# Patient Record
Sex: FEMALE | Race: BLACK OR AFRICAN AMERICAN | NOT HISPANIC OR LATINO | Employment: FULL TIME | URBAN - METROPOLITAN AREA
[De-identification: names, ages, dates, MRNs, and addresses within clinical notes are randomized per-mention and may not be internally consistent; named-entity substitution may affect disease eponyms.]

---

## 2017-01-11 ENCOUNTER — HISTORICAL (OUTPATIENT)
Dept: LAB | Facility: HOSPITAL | Age: 22
End: 2017-01-11

## 2017-01-12 ENCOUNTER — HISTORICAL (OUTPATIENT)
Dept: LAB | Facility: HOSPITAL | Age: 22
End: 2017-01-12

## 2020-04-27 ENCOUNTER — HISTORICAL (OUTPATIENT)
Dept: RADIOLOGY | Facility: HOSPITAL | Age: 25
End: 2020-04-27

## 2022-03-31 ENCOUNTER — HISTORICAL (OUTPATIENT)
Dept: ADMINISTRATIVE | Facility: HOSPITAL | Age: 27
End: 2022-03-31

## 2022-04-07 ENCOUNTER — HISTORICAL (OUTPATIENT)
Dept: LAB | Facility: HOSPITAL | Age: 27
End: 2022-04-07

## 2022-04-07 LAB
B-HCG FREE SERPL-ACNC: NORMAL [IU]/L
CHLAMYDIA TRACHOMATIS (PRECISION): NEGATIVE
HPV16+18+H RISK 12 DNA CVX-IMP: NEGATIVE
NEISSERIA GONORRHOEAE (PRECISION): NEGATIVE
PAP RECOMMENDATION EXT: NORMAL
PAP SMEAR: NORMAL
TRICHOMONAS VAGINALIS (PRECISION): NEGATIVE

## 2022-04-09 ENCOUNTER — HISTORICAL (OUTPATIENT)
Dept: LAB | Facility: HOSPITAL | Age: 27
End: 2022-04-09

## 2022-04-09 LAB — B-HCG FREE SERPL-ACNC: NORMAL [IU]/L

## 2022-05-06 ENCOUNTER — LAB VISIT (OUTPATIENT)
Dept: LAB | Facility: HOSPITAL | Age: 27
End: 2022-05-06
Attending: OBSTETRICS & GYNECOLOGY
Payer: MEDICAID

## 2022-05-06 DIAGNOSIS — Z34.81 ENCOUNTER FOR SUPERVISION OF NORMAL PREGNANCY IN MULTIGRAVIDA IN FIRST TRIMESTER: Primary | ICD-10-CM

## 2022-05-06 LAB
AMPHET UR QL SCN: NEGATIVE
BARBITURATE SCN PRESENT UR: NEGATIVE
BASOPHILS # BLD AUTO: 0.03 X10(3)/MCL (ref 0–0.2)
BASOPHILS NFR BLD AUTO: 0.3 %
BENZODIAZ UR QL SCN: NEGATIVE
CANNABINOIDS UR QL SCN: NEGATIVE
COCAINE UR QL SCN: NEGATIVE
EOSINOPHIL # BLD AUTO: 0.05 X10(3)/MCL (ref 0–0.9)
EOSINOPHIL NFR BLD AUTO: 0.5 %
ERYTHROCYTE [DISTWIDTH] IN BLOOD BY AUTOMATED COUNT: 13.1 % (ref 11.5–17)
GROUP & RH: NORMAL
HBV SURFACE AG SERPL QL IA: NONREACTIVE
HCT VFR BLD AUTO: 35.9 % (ref 37–47)
HCV AB SERPL QL IA: NONREACTIVE
HGB BLD-MCNC: 11.7 GM/DL (ref 12–16)
HIV 1+2 AB+HIV1 P24 AG SERPL QL IA: NONREACTIVE
IMM GRANULOCYTES # BLD AUTO: 0.03 X10(3)/MCL (ref 0–0.02)
IMM GRANULOCYTES NFR BLD AUTO: 0.3 % (ref 0–0.43)
INDIRECT COOMBS GEL: NORMAL
LYMPHOCYTES # BLD AUTO: 2.02 X10(3)/MCL (ref 0.6–4.6)
LYMPHOCYTES NFR BLD AUTO: 21.7 %
MCH RBC QN AUTO: 28.1 PG (ref 27–31)
MCHC RBC AUTO-ENTMCNC: 32.6 MG/DL (ref 33–36)
MCV RBC AUTO: 86.3 FL (ref 80–94)
MONOCYTES # BLD AUTO: 0.43 X10(3)/MCL (ref 0.1–1.3)
MONOCYTES NFR BLD AUTO: 4.6 %
NEUTROPHILS # BLD AUTO: 6.8 X10(3)/MCL (ref 2.1–9.2)
NEUTROPHILS NFR BLD AUTO: 72.6 %
OPIATES UR QL SCN: NEGATIVE
PCP UR QL: NEGATIVE
PH UR: 6 [PH] (ref 3–11)
PLATELET # BLD AUTO: 250 X10(3)/MCL (ref 130–400)
PMV BLD AUTO: 11.1 FL (ref 9.4–12.4)
RBC # BLD AUTO: 4.16 X10(6)/MCL (ref 4.2–5.4)
SPECIFIC GRAVITY, URINE AUTO (.000) (OHS): 1.02 (ref 1–1.03)
T PALLIDUM AB SER QL: NONREACTIVE
T PALLIDUM AB SER QL: NORMAL
T4 FREE SERPL-MCNC: 0.83 NG/DL (ref 0.7–1.48)
TSH SERPL-ACNC: 0.4 UIU/ML (ref 0.35–4.94)
WBC # SPEC AUTO: 9.3 X10(3)/MCL (ref 4.5–11.5)

## 2022-05-06 PROCEDURE — 86762 RUBELLA ANTIBODY: CPT

## 2022-05-06 PROCEDURE — 86850 RBC ANTIBODY SCREEN: CPT | Performed by: OBSTETRICS & GYNECOLOGY

## 2022-05-06 PROCEDURE — 86780 TREPONEMA PALLIDUM: CPT

## 2022-05-06 PROCEDURE — 84443 ASSAY THYROID STIM HORMONE: CPT

## 2022-05-06 PROCEDURE — 87389 HIV-1 AG W/HIV-1&-2 AB AG IA: CPT

## 2022-05-06 PROCEDURE — 86787 VARICELLA-ZOSTER ANTIBODY: CPT

## 2022-05-06 PROCEDURE — 85660 RBC SICKLE CELL TEST: CPT

## 2022-05-06 PROCEDURE — 80307 DRUG TEST PRSMV CHEM ANLYZR: CPT

## 2022-05-06 PROCEDURE — 86376 MICROSOMAL ANTIBODY EACH: CPT

## 2022-05-06 PROCEDURE — 36415 COLL VENOUS BLD VENIPUNCTURE: CPT

## 2022-05-06 PROCEDURE — 86803 HEPATITIS C AB TEST: CPT

## 2022-05-06 PROCEDURE — 87340 HEPATITIS B SURFACE AG IA: CPT

## 2022-05-06 PROCEDURE — 85025 COMPLETE CBC W/AUTO DIFF WBC: CPT

## 2022-05-06 PROCEDURE — 84439 ASSAY OF FREE THYROXINE: CPT

## 2022-05-09 LAB — HGB S BLD QL SOLY: NEGATIVE

## 2022-05-10 LAB
RUBV IGG SERPL IA-ACNC: 0.5
RUBV IGG SERPL QL IA: NEGATIVE
VZV IGG SER IA-ACNC: <0.2
VZV IGG SER QL IA: NEGATIVE

## 2022-06-29 ENCOUNTER — LAB VISIT (OUTPATIENT)
Dept: LAB | Facility: HOSPITAL | Age: 27
End: 2022-06-29
Attending: OBSTETRICS & GYNECOLOGY
Payer: MEDICAID

## 2022-06-29 DIAGNOSIS — Z34.82 SUPERVISION OF NORMAL INTRAUTERINE PREGNANCY IN MULTIGRAVIDA, SECOND TRIMESTER: ICD-10-CM

## 2022-06-29 PROCEDURE — 36415 COLL VENOUS BLD VENIPUNCTURE: CPT

## 2022-07-01 LAB
# FETUSES: NORMAL
2ND TRIMESTER 4 SCREEN SERPL-IMP: NORMAL
AFP ADJ MOM SERPL: 0.98 MOM
AFP SERPL IA-MCNC: 36.8 NG/ML
AGE AT DELIVERY: NORMAL
B-HCG ADJ MOM SERPL: 0.86 MOM
CHORION TYPE: NORMAL
COLLECT DATE: NORMAL
CURRENT SMOKER: NORMAL
FET TS 21 RISK FROM MAT AGE: NORMAL
GA EST FROM LMP: NORMAL WK,D
GA METHOD: NORMAL
HCG SERPL IA-ACNC: 19.9 IU/ML
HX OF NTD QL: NO
HX OF NTD QL: NO
HX OF TRISOMY 21 QL: NO
IDDM PATIENT QL: NO
INHIBIN A ADJ MOM SERPL: 0.65 MOM
INHIBIN SERPL-MCNC: 71 PG/ML
IVF PREGNANCY: NO
LABORATORY COMMENT REPORT: NORMAL
M PHYSICIAN PHONE NUMBER: NORMAL
MATERNAL RISK FACTORS: NORMAL
NEURAL TUBE DEFECT RISK FETUS: NORMAL %
RECOM F/U: NORMAL
TEST PERFORMANCE INFO SPEC: NORMAL
TS 18 RISK FETUS: NORMAL
TS 21 RISK FETUS: NORMAL
U ESTRIOL ADJ MOM SERPL: 0.77 MOM
U ESTRIOL SERPL-MCNC: 0.97 NG/ML

## 2022-09-03 ENCOUNTER — LAB VISIT (OUTPATIENT)
Dept: LAB | Facility: HOSPITAL | Age: 27
End: 2022-09-03
Attending: OBSTETRICS & GYNECOLOGY
Payer: MEDICAID

## 2022-09-03 DIAGNOSIS — Z34.82 SUPERVISION OF NORMAL INTRAUTERINE PREGNANCY IN MULTIGRAVIDA, SECOND TRIMESTER: ICD-10-CM

## 2022-09-03 LAB — GLUCOSE 1H P 100 G GLC PO SERPL-MCNC: 147 MG/DL (ref 74–100)

## 2022-09-03 PROCEDURE — 82950 GLUCOSE TEST: CPT

## 2022-09-03 PROCEDURE — 36415 COLL VENOUS BLD VENIPUNCTURE: CPT

## 2022-09-10 ENCOUNTER — LAB VISIT (OUTPATIENT)
Dept: LAB | Facility: HOSPITAL | Age: 27
End: 2022-09-10
Attending: NURSE PRACTITIONER
Payer: MEDICAID

## 2022-09-10 DIAGNOSIS — R73.09 ELEVATED GLUCOSE TOLERANCE TEST: ICD-10-CM

## 2022-09-10 DIAGNOSIS — Z34.83 MULTIGRAVIDA IN THIRD TRIMESTER: ICD-10-CM

## 2022-09-10 LAB
GLUCOSE 1H P 100 G GLC PO SERPL-MCNC: 136 MG/DL (ref 74–100)
GLUCOSE 2H P 100 G GLC PO SERPL-MCNC: 126 MG/DL (ref 74–100)
GLUCOSE 3H P 100 G GLC PO SERPL-MCNC: 124 MG/DL (ref 74–100)
GLUCOSE P FAST SERPL-MCNC: 136 MG/DL (ref 74–100)
GLUCOSE P FAST SERPL-MCNC: 83 MG/DL (ref 74–100)
GLUCOSE P FAST SERPL-MCNC: 83 MG/DL (ref 74–100)

## 2022-09-10 PROCEDURE — 36415 COLL VENOUS BLD VENIPUNCTURE: CPT

## 2022-09-10 PROCEDURE — 82951 GLUCOSE TOLERANCE TEST (GTT): CPT | Mod: 59

## 2022-09-10 PROCEDURE — 82947 ASSAY GLUCOSE BLOOD QUANT: CPT

## 2022-09-10 PROCEDURE — 82950 GLUCOSE TEST: CPT

## 2022-11-07 ENCOUNTER — LAB VISIT (OUTPATIENT)
Dept: LAB | Facility: HOSPITAL | Age: 27
End: 2022-11-07
Attending: OBSTETRICS & GYNECOLOGY
Payer: MEDICAID

## 2022-11-07 DIAGNOSIS — Z34.83 SUPERVISION OF NORMAL INTRAUTERINE PREGNANCY IN MULTIGRAVIDA IN THIRD TRIMESTER: ICD-10-CM

## 2022-11-07 LAB
BASOPHILS # BLD AUTO: 0.03 X10(3)/MCL (ref 0–0.2)
BASOPHILS NFR BLD AUTO: 0.3 %
EOSINOPHIL # BLD AUTO: 0.07 X10(3)/MCL (ref 0–0.9)
EOSINOPHIL NFR BLD AUTO: 0.6 %
ERYTHROCYTE [DISTWIDTH] IN BLOOD BY AUTOMATED COUNT: 14 % (ref 11.5–17)
HCT VFR BLD AUTO: 35.1 % (ref 37–47)
HGB BLD-MCNC: 11.5 GM/DL (ref 12–16)
HIV 1+2 AB+HIV1 P24 AG SERPL QL IA: NONREACTIVE
IMM GRANULOCYTES # BLD AUTO: 0.07 X10(3)/MCL (ref 0–0.04)
IMM GRANULOCYTES NFR BLD AUTO: 0.6 %
LYMPHOCYTES # BLD AUTO: 1.96 X10(3)/MCL (ref 0.6–4.6)
LYMPHOCYTES NFR BLD AUTO: 18 %
MCH RBC QN AUTO: 27.8 PG (ref 27–31)
MCHC RBC AUTO-ENTMCNC: 32.8 MG/DL (ref 33–36)
MCV RBC AUTO: 85 FL (ref 80–94)
MONOCYTES # BLD AUTO: 0.77 X10(3)/MCL (ref 0.1–1.3)
MONOCYTES NFR BLD AUTO: 7.1 %
NEUTROPHILS # BLD AUTO: 8 X10(3)/MCL (ref 2.1–9.2)
NEUTROPHILS NFR BLD AUTO: 73.4 %
PLATELET # BLD AUTO: 240 X10(3)/MCL (ref 130–400)
PMV BLD AUTO: 10.7 FL (ref 7.4–10.4)
RBC # BLD AUTO: 4.13 X10(6)/MCL (ref 4.2–5.4)
T PALLIDUM AB SER QL: NONREACTIVE
WBC # SPEC AUTO: 10.9 X10(3)/MCL (ref 4.5–11.5)

## 2022-11-07 PROCEDURE — 86780 TREPONEMA PALLIDUM: CPT

## 2022-11-07 PROCEDURE — 85025 COMPLETE CBC W/AUTO DIFF WBC: CPT

## 2022-11-07 PROCEDURE — 87389 HIV-1 AG W/HIV-1&-2 AB AG IA: CPT

## 2022-11-07 PROCEDURE — 36415 COLL VENOUS BLD VENIPUNCTURE: CPT

## 2022-11-21 ENCOUNTER — HOSPITAL ENCOUNTER (INPATIENT)
Facility: HOSPITAL | Age: 27
LOS: 2 days | Discharge: HOME | End: 2022-11-23
Attending: OBSTETRICS & GYNECOLOGY | Admitting: OBSTETRICS & GYNECOLOGY
Payer: MEDICAID

## 2022-11-21 DIAGNOSIS — Z37.9 NORMAL LABOR: ICD-10-CM

## 2022-11-21 LAB
BASOPHILS # BLD AUTO: 0.03 X10(3)/MCL (ref 0–0.2)
BASOPHILS NFR BLD AUTO: 0.2 %
CTP QC/QA: YES
EOSINOPHIL # BLD AUTO: 0.06 X10(3)/MCL (ref 0–0.9)
EOSINOPHIL NFR BLD AUTO: 0.5 %
ERYTHROCYTE [DISTWIDTH] IN BLOOD BY AUTOMATED COUNT: 14.3 % (ref 11.5–17)
GROUP & RH: NORMAL
HCT VFR BLD AUTO: 38.5 % (ref 37–47)
HGB BLD-MCNC: 13 GM/DL (ref 12–16)
IMM GRANULOCYTES # BLD AUTO: 0.1 X10(3)/MCL (ref 0–0.04)
IMM GRANULOCYTES NFR BLD AUTO: 0.8 %
INDIRECT COOMBS GEL: NORMAL
LYMPHOCYTES # BLD AUTO: 2.9 X10(3)/MCL (ref 0.6–4.6)
LYMPHOCYTES NFR BLD AUTO: 23.3 %
MCH RBC QN AUTO: 28.6 PG (ref 27–31)
MCHC RBC AUTO-ENTMCNC: 33.8 MG/DL (ref 33–36)
MCV RBC AUTO: 84.6 FL (ref 80–94)
MONOCYTES # BLD AUTO: 0.86 X10(3)/MCL (ref 0.1–1.3)
MONOCYTES NFR BLD AUTO: 6.9 %
NEUTROPHILS # BLD AUTO: 8.5 X10(3)/MCL (ref 2.1–9.2)
NEUTROPHILS NFR BLD AUTO: 68.3 %
NRBC BLD AUTO-RTO: 0 %
PLATELET # BLD AUTO: 248 X10(3)/MCL (ref 130–400)
PMV BLD AUTO: 11.3 FL (ref 7.4–10.4)
RBC # BLD AUTO: 4.55 X10(6)/MCL (ref 4.2–5.4)
RUPTURE OF MEMBRANE: POSITIVE
T PALLIDUM AB SER QL: NONREACTIVE
WBC # SPEC AUTO: 12.4 X10(3)/MCL (ref 4.5–11.5)

## 2022-11-21 PROCEDURE — 86780 TREPONEMA PALLIDUM: CPT | Performed by: OBSTETRICS & GYNECOLOGY

## 2022-11-21 PROCEDURE — 72200004 HC VAGINAL DELIVERY LEVEL I

## 2022-11-21 PROCEDURE — 11000001 HC ACUTE MED/SURG PRIVATE ROOM

## 2022-11-21 PROCEDURE — 85025 COMPLETE CBC W/AUTO DIFF WBC: CPT | Performed by: OBSTETRICS & GYNECOLOGY

## 2022-11-21 PROCEDURE — 72100002 HC LABOR CARE, 1ST 8 HOURS

## 2022-11-21 PROCEDURE — 86901 BLOOD TYPING SEROLOGIC RH(D): CPT | Performed by: OBSTETRICS & GYNECOLOGY

## 2022-11-21 PROCEDURE — 25000003 PHARM REV CODE 250: Performed by: OBSTETRICS & GYNECOLOGY

## 2022-11-21 PROCEDURE — 99285 EMERGENCY DEPT VISIT HI MDM: CPT | Mod: 25

## 2022-11-21 RX ORDER — PROCHLORPERAZINE EDISYLATE 5 MG/ML
5 INJECTION INTRAMUSCULAR; INTRAVENOUS EVERY 6 HOURS PRN
Status: DISCONTINUED | OUTPATIENT
Start: 2022-11-21 | End: 2022-11-28 | Stop reason: HOSPADM

## 2022-11-21 RX ORDER — MUPIROCIN 20 MG/G
OINTMENT TOPICAL
Status: CANCELLED | OUTPATIENT
Start: 2022-11-21

## 2022-11-21 RX ORDER — DIPHENHYDRAMINE HCL 25 MG
25 CAPSULE ORAL EVERY 4 HOURS PRN
Status: DISCONTINUED | OUTPATIENT
Start: 2022-11-21 | End: 2022-11-28 | Stop reason: HOSPADM

## 2022-11-21 RX ORDER — DIPHENHYDRAMINE HYDROCHLORIDE 50 MG/ML
25 INJECTION INTRAMUSCULAR; INTRAVENOUS EVERY 4 HOURS PRN
Status: DISCONTINUED | OUTPATIENT
Start: 2022-11-21 | End: 2022-11-28 | Stop reason: HOSPADM

## 2022-11-21 RX ORDER — SODIUM CHLORIDE 0.9 % (FLUSH) 0.9 %
10 SYRINGE (ML) INJECTION
Status: DISCONTINUED | OUTPATIENT
Start: 2022-11-21 | End: 2022-11-28 | Stop reason: HOSPADM

## 2022-11-21 RX ORDER — HYDROCORTISONE 25 MG/G
CREAM TOPICAL 3 TIMES DAILY PRN
Status: DISCONTINUED | OUTPATIENT
Start: 2022-11-21 | End: 2022-11-28 | Stop reason: HOSPADM

## 2022-11-21 RX ORDER — OXYTOCIN/RINGER'S LACTATE 30/500 ML
95 PLASTIC BAG, INJECTION (ML) INTRAVENOUS ONCE
Status: DISCONTINUED | OUTPATIENT
Start: 2022-11-21 | End: 2022-11-28 | Stop reason: HOSPADM

## 2022-11-21 RX ORDER — LIDOCAINE HYDROCHLORIDE 10 MG/ML
10 INJECTION INFILTRATION; PERINEURAL ONCE AS NEEDED
Status: DISCONTINUED | OUTPATIENT
Start: 2022-11-21 | End: 2022-11-28 | Stop reason: HOSPADM

## 2022-11-21 RX ORDER — SIMETHICONE 80 MG
1 TABLET,CHEWABLE ORAL 4 TIMES DAILY PRN
Status: DISCONTINUED | OUTPATIENT
Start: 2022-11-21 | End: 2022-11-28 | Stop reason: HOSPADM

## 2022-11-21 RX ORDER — DEXTROSE, SODIUM CHLORIDE, SODIUM LACTATE, POTASSIUM CHLORIDE, AND CALCIUM CHLORIDE 5; .6; .31; .03; .02 G/100ML; G/100ML; G/100ML; G/100ML; G/100ML
INJECTION, SOLUTION INTRAVENOUS CONTINUOUS
Status: DISCONTINUED | OUTPATIENT
Start: 2022-11-21 | End: 2022-11-28 | Stop reason: HOSPADM

## 2022-11-21 RX ORDER — DIPHENOXYLATE HYDROCHLORIDE AND ATROPINE SULFATE 2.5; .025 MG/1; MG/1
1 TABLET ORAL 4 TIMES DAILY PRN
Status: DISCONTINUED | OUTPATIENT
Start: 2022-11-21 | End: 2022-11-28 | Stop reason: HOSPADM

## 2022-11-21 RX ORDER — DOCUSATE SODIUM 100 MG/1
200 CAPSULE, LIQUID FILLED ORAL 2 TIMES DAILY PRN
Status: DISCONTINUED | OUTPATIENT
Start: 2022-11-21 | End: 2022-11-28 | Stop reason: HOSPADM

## 2022-11-21 RX ORDER — OXYTOCIN/RINGER'S LACTATE 30/500 ML
334 PLASTIC BAG, INJECTION (ML) INTRAVENOUS ONCE
Status: DISCONTINUED | OUTPATIENT
Start: 2022-11-21 | End: 2022-11-28 | Stop reason: HOSPADM

## 2022-11-21 RX ORDER — ACETAMINOPHEN 325 MG/1
650 TABLET ORAL EVERY 6 HOURS PRN
Status: DISCONTINUED | OUTPATIENT
Start: 2022-11-21 | End: 2022-11-28 | Stop reason: HOSPADM

## 2022-11-21 RX ORDER — OXYCODONE AND ACETAMINOPHEN 10; 325 MG/1; MG/1
1 TABLET ORAL EVERY 4 HOURS PRN
Status: DISCONTINUED | OUTPATIENT
Start: 2022-11-21 | End: 2022-11-28 | Stop reason: HOSPADM

## 2022-11-21 RX ORDER — PRENATAL WITH FERROUS FUM AND FOLIC ACID 3080; 920; 120; 400; 22; 1.84; 3; 20; 10; 1; 12; 200; 27; 25; 2 [IU]/1; [IU]/1; MG/1; [IU]/1; MG/1; MG/1; MG/1; MG/1; MG/1; MG/1; UG/1; MG/1; MG/1; MG/1; MG/1
1 TABLET ORAL DAILY
Status: DISCONTINUED | OUTPATIENT
Start: 2022-11-21 | End: 2022-11-28 | Stop reason: HOSPADM

## 2022-11-21 RX ORDER — SIMETHICONE 80 MG
1 TABLET,CHEWABLE ORAL EVERY 6 HOURS PRN
Status: DISCONTINUED | OUTPATIENT
Start: 2022-11-21 | End: 2022-11-28 | Stop reason: HOSPADM

## 2022-11-21 RX ORDER — BUTORPHANOL TARTRATE 2 MG/ML
2 INJECTION INTRAMUSCULAR; INTRAVENOUS
Status: DISCONTINUED | OUTPATIENT
Start: 2022-11-21 | End: 2022-11-28 | Stop reason: HOSPADM

## 2022-11-21 RX ORDER — ONDANSETRON 4 MG/1
8 TABLET, ORALLY DISINTEGRATING ORAL EVERY 8 HOURS PRN
Status: DISCONTINUED | OUTPATIENT
Start: 2022-11-21 | End: 2022-11-28 | Stop reason: HOSPADM

## 2022-11-21 RX ORDER — MISOPROSTOL 100 UG/1
800 TABLET ORAL
Status: DISCONTINUED | OUTPATIENT
Start: 2022-11-21 | End: 2022-11-28 | Stop reason: HOSPADM

## 2022-11-21 RX ORDER — BUTORPHANOL TARTRATE 2 MG/ML
1 INJECTION INTRAMUSCULAR; INTRAVENOUS
Status: DISCONTINUED | OUTPATIENT
Start: 2022-11-21 | End: 2022-11-28 | Stop reason: HOSPADM

## 2022-11-21 RX ORDER — OXYCODONE AND ACETAMINOPHEN 5; 325 MG/1; MG/1
1 TABLET ORAL EVERY 4 HOURS PRN
Status: DISCONTINUED | OUTPATIENT
Start: 2022-11-21 | End: 2022-11-28 | Stop reason: HOSPADM

## 2022-11-21 RX ORDER — IBUPROFEN 600 MG/1
600 TABLET ORAL EVERY 6 HOURS PRN
Status: DISCONTINUED | OUTPATIENT
Start: 2022-11-21 | End: 2022-11-28 | Stop reason: HOSPADM

## 2022-11-21 RX ORDER — SODIUM CHLORIDE, SODIUM LACTATE, POTASSIUM CHLORIDE, CALCIUM CHLORIDE 600; 310; 30; 20 MG/100ML; MG/100ML; MG/100ML; MG/100ML
INJECTION, SOLUTION INTRAVENOUS CONTINUOUS
Status: DISCONTINUED | OUTPATIENT
Start: 2022-11-21 | End: 2022-11-28 | Stop reason: HOSPADM

## 2022-11-21 RX ORDER — ONDANSETRON 2 MG/ML
4 INJECTION INTRAMUSCULAR; INTRAVENOUS EVERY 6 HOURS PRN
Status: DISCONTINUED | OUTPATIENT
Start: 2022-11-21 | End: 2022-11-28 | Stop reason: HOSPADM

## 2022-11-21 RX ORDER — METHYLERGONOVINE MALEATE 0.2 MG/ML
200 INJECTION INTRAVENOUS
Status: DISCONTINUED | OUTPATIENT
Start: 2022-11-21 | End: 2022-11-28 | Stop reason: HOSPADM

## 2022-11-21 RX ORDER — CARBOPROST TROMETHAMINE 250 UG/ML
250 INJECTION, SOLUTION INTRAMUSCULAR
Status: DISCONTINUED | OUTPATIENT
Start: 2022-11-21 | End: 2022-11-28 | Stop reason: HOSPADM

## 2022-11-21 RX ORDER — CALCIUM CARBONATE 200(500)MG
500 TABLET,CHEWABLE ORAL 3 TIMES DAILY PRN
Status: DISCONTINUED | OUTPATIENT
Start: 2022-11-21 | End: 2022-11-28 | Stop reason: HOSPADM

## 2022-11-21 RX ADMIN — IBUPROFEN 600 MG: 600 TABLET, FILM COATED ORAL at 11:11

## 2022-11-21 RX ADMIN — IBUPROFEN 600 MG: 600 TABLET, FILM COATED ORAL at 06:11

## 2022-11-21 RX ADMIN — IBUPROFEN 600 MG: 600 TABLET, FILM COATED ORAL at 04:11

## 2022-11-21 RX ADMIN — DOCUSATE SODIUM 200 MG: 100 CAPSULE, LIQUID FILLED ORAL at 09:11

## 2022-11-21 RX ADMIN — PRENATAL VITAMINS-IRON FUMARATE 27 MG IRON-FOLIC ACID 0.8 MG TABLET 1 TABLET: at 09:11

## 2022-11-21 RX ADMIN — BENZOCAINE AND LEVOMENTHOL: 200; 5 SPRAY TOPICAL at 04:11

## 2022-11-21 NOTE — LACTATION NOTE
"Mother reports infant Bf x1 past delivery for 24".  Observed infant at right breast with football position; comfortable latch reported.  Effective latch with active suckling x15" noted, audible swallows.  Basic Bf education reviewed, written material provided.   "

## 2022-11-21 NOTE — OP NOTE
OPERATIVE REPORT    Date of Procedure: 2022    Patient Name: Trsea Griffin    MRN: [id number]    Surgeon: Terrence Morataya MD    Assistant:     Pre-Operative Diagnosis: 38 wk precipitous delivery  Post-Operative Diagnosis: Same    Anesthesia: None    Procedure:       Complications: No    Estimated Blood Loss (EBL): 300           Findings: Live Male    Specimens: Cord Blood    Description:  Patient was prepped and draped in a normal sterile fashion. The 2nd stage of labor was within normal limits. The patient's head delivered in DAY. Infants head delivered without any difficulties along with the shoulders and the rest of the infant. No Nuchal cords and Clear fluid noted. Mouth and nose were suctioned on the abdomen. Cord was doubly clamped and cut. The infant was handed to the waiting staff. Cord blood taken. The placenta was extracted spontaneously. There was No lacerations on the perineum and No laceration the labia  Dr. Hanson entered the room shortly after. Estimated blood loss was 300 patient tolerated the procedure well and instrument and sponge count were correct patient was to recover in labor room for approximately 1 hour.

## 2022-11-21 NOTE — L&D DELIVERY NOTE
[facility] - Labor and Delivery  Delivery  Procedure Note    SUMMARY     Date of Procedure: 2022     Procedure: Spontaneous vaginal delivery    Obstetrician: chepe hooper md    Assistant: none    Pre-Delivery Diagnosis: Term pregnancy    Post-Delivery Diagnosis: Living  infant(s)     Anesthesia: none    Episiotomy or Incision: none    Indications for Instrumental Delivery: none        Apgars: 1' 9  /  5' 9     Placenta and Cord:            Mechanism: spontaneous        Description: complete      Descriptions of the findings of the procedure: mom in active labor delivered  in rhona no tears no lacerations   Placenta delivered intact          Estimated Blood Loss (EBL): less than 50 mL                    Infant Feeding: breast     Condition: stable    Disposition: stable

## 2022-11-21 NOTE — PROGRESS NOTES
Pt seen this am no complaints.baby on her chest  Min lochia  Vss afberile  Aao3  Abd nt nd  Ext nt  Sp  pp1 doing well

## 2022-11-21 NOTE — PLAN OF CARE
Problem: Adult Inpatient Plan of Care  Goal: Plan of Care Review  Outcome: Ongoing, Progressing  Goal: Patient-Specific Goal (Individualized)  Outcome: Ongoing, Progressing  Goal: Absence of Hospital-Acquired Illness or Injury  Outcome: Ongoing, Progressing  Goal: Optimal Comfort and Wellbeing  Outcome: Ongoing, Progressing  Goal: Readiness for Transition of Care  Outcome: Ongoing, Progressing     Problem: Bariatric Environmental Safety  Goal: Safety Maintained with Care  Outcome: Ongoing, Progressing     Problem:  Fall Injury Risk  Goal: Absence of Fall, Infant Drop and Related Injury  Outcome: Ongoing, Progressing     Problem: Infection  Goal: Absence of Infection Signs and Symptoms  Outcome: Ongoing, Progressing     Problem: Adjustment to Role Transition (Postpartum Vaginal Delivery)  Goal: Successful Maternal Role Transition  Outcome: Ongoing, Progressing     Problem: Bleeding (Postpartum Vaginal Delivery)  Goal: Hemostasis  Outcome: Ongoing, Progressing     Problem: Infection (Postpartum Vaginal Delivery)  Goal: Absence of Infection Signs/Symptoms  Outcome: Ongoing, Progressing     Problem: Pain (Postpartum Vaginal Delivery)  Goal: Acceptable Pain Control  Outcome: Ongoing, Progressing     Problem: Urinary Retention (Postpartum Vaginal Delivery)  Goal: Effective Urinary Elimination  Outcome: Ongoing, Progressing     Problem: Breastfeeding  Goal: Effective Breastfeeding  Outcome: Ongoing, Progressing

## 2022-11-21 NOTE — PLAN OF CARE
Problem: Breastfeeding  Goal: Effective Breastfeeding  Intervention: Promote Effective Breastfeeding  Flowsheets (Taken 11/21/2022 1001)  Breastfeeding Assistance:   assisted with positioning   infant latch-on verified   infant suck/swallow verified   feeding session observed   feeding on demand promoted   feeding cue recognition promoted   support offered  Intervention: Support Exclusive Breastfeeding Success  Flowsheets (Taken 11/21/2022 1001)  Supportive Measures: active listening utilized  Breastfeeding Support:   encouragement provided   lactation counseling provided

## 2022-11-21 NOTE — H&P
[facility] - Labor and Delivery  Obstetrics  History & Physical    Patient Name: Tresa Griffin  MRN: [id number]  Admission Date: 2022  Primary Care Provider: Jessy Cutler MD    Subjective:     Principal Problem:<principal problem not specified>    History of Present Illness: labor    Obstetric HPI:  Patient reports Date/time of onset: 11pm contractions, active fetal movement, No vaginal bleeding , No loss of fluid     This pregnancy has been complicated by none    OB History    Para Term  AB Living   3 1 1 0 1 1   SAB IAB Ectopic Multiple Live Births   1 0 0 0 1      # Outcome Date GA Lbr Calderon/2nd Weight Sex Delivery Anes PTL Lv   3 Current            2 2019     SAB   FD   1 Term 17 38w0d   M Vag-Spont   GRETCHEN     No past medical history on file.  History reviewed. No pertinent surgical history.    PTA Medications   Medication Sig    CLASSIC PRENATAL 28 mg iron- 800 mcg Tab Take 1 tablet by mouth once daily.    famotidine (PEPCID) 20 MG tablet Take 1 tablet (20 mg total) by mouth once daily.    famotidine (PEPCID) 20 MG tablet Take 1 tablet (20 mg total) by mouth once daily.    ondansetron (ZOFRAN-ODT) 4 MG TbDL Take 1 tablet (4 mg total) by mouth daily as needed (prn nausia and vomiting).    promethazine (PHENERGAN) 12.5 MG Tab Take 1 tablet (12.5 mg total) by mouth 2 (two) times a day.       Review of patient's allergies indicates:  No Known Allergies     Family History       Problem Relation (Age of Onset)    Hypertension Mother          Tobacco Use    Smoking status: Never    Smokeless tobacco: Never   Substance and Sexual Activity    Alcohol use: Never    Drug use: Never    Sexual activity: Not Currently     Review of Systems   Objective:     Vital Signs (Most Recent):    Vital Signs (24h Range):           There is no height or weight on file to calculate BMI.    FHT: 145Cat 1 (reassuring)  TOCO:  Q 5 minutes    Physical Exam    Cervix:  Dilation:  10  Effacement:   100%  Station: -2  Presentation: Vertex     Significant Labs:  Lab Results   Component Value Date    GROUPTRH O POS 2022    STREPBCULT neg 10/31/2022    AFP 36.8 2022       I have personallly reviewed all pertinent lab results from the last 24 hours.    Assessment/Plan:     27 y.o. female  at 38w2d for:    There are no hospital problems to display for this patient.      Admit to lnd for labor      Gerard Hanson MD  Obstetrics  [facility] - Labor and Delivery

## 2022-11-22 PROCEDURE — 25000003 PHARM REV CODE 250: Performed by: OBSTETRICS & GYNECOLOGY

## 2022-11-22 PROCEDURE — 11000001 HC ACUTE MED/SURG PRIVATE ROOM

## 2022-11-22 RX ADMIN — IBUPROFEN 600 MG: 600 TABLET, FILM COATED ORAL at 02:11

## 2022-11-22 RX ADMIN — PRENATAL VITAMINS-IRON FUMARATE 27 MG IRON-FOLIC ACID 0.8 MG TABLET 1 TABLET: at 08:11

## 2022-11-22 NOTE — LACTATION NOTE
"Infant sleepy post-circumcision and ultrasound. Assisted with hand expression, 0.8 ml collected and fed via syringe/finger feeding.  Assisted with cross-cradle positioning and latch, infant remained sleepy; encouraged to do skin-to-skin, feed with early cues, continue hand expression if Bf <10".   "

## 2022-11-22 NOTE — PROGRESS NOTES
Pt seen this am no complaints. Min lochia  Vss afebrile  Aao3  Abd uterus firm  Ext nt      Sp  pp1 doing well

## 2022-11-22 NOTE — PLAN OF CARE
Problem: Breastfeeding  Goal: Effective Breastfeeding  Outcome: Ongoing, Progressing  Intervention: Promote Effective Breastfeeding  Flowsheets (Taken 11/22/2022 1053)  Breastfeeding Assistance: support offered

## 2022-11-22 NOTE — LACTATION NOTE
"Mother stated she wasn't sure if she wants to continue Bf.  Discussed how we know he is "getting enough".  Mother denies nipple discomfort/pain with Bf at this time; breasts are heavier.  Encouraged to set 2 week goal of Bf then she can have baby help her through engorgement  rather than abrupt discontinuation.  Offered her  use of double electric pump/discussed OP resources if desired.  Encouraged to Bf before offering bottle and gradual tapering. Parents to discuss, call for help if needed.  "

## 2022-11-23 VITALS
HEART RATE: 94 BPM | RESPIRATION RATE: 18 BRPM | OXYGEN SATURATION: 100 % | DIASTOLIC BLOOD PRESSURE: 79 MMHG | SYSTOLIC BLOOD PRESSURE: 152 MMHG | TEMPERATURE: 98 F

## 2022-11-23 PROBLEM — Z37.9 NORMAL LABOR: Status: ACTIVE | Noted: 2022-11-23

## 2022-11-23 PROCEDURE — 25000003 PHARM REV CODE 250: Performed by: OBSTETRICS & GYNECOLOGY

## 2022-11-23 PROCEDURE — 11000001 HC ACUTE MED/SURG PRIVATE ROOM

## 2022-11-23 RX ORDER — IBUPROFEN 600 MG/1
600 TABLET ORAL EVERY 6 HOURS PRN
Qty: 30 TABLET | Refills: 0 | Status: SHIPPED | OUTPATIENT
Start: 2022-11-23

## 2022-11-23 RX ORDER — OXYCODONE AND ACETAMINOPHEN 5; 325 MG/1; MG/1
1 TABLET ORAL EVERY 6 HOURS PRN
Qty: 15 TABLET | Refills: 0 | Status: SHIPPED | OUTPATIENT
Start: 2022-11-23

## 2022-11-23 RX ADMIN — IBUPROFEN 600 MG: 600 TABLET, FILM COATED ORAL at 03:11

## 2022-11-23 RX ADMIN — ONDANSETRON 8 MG: 4 TABLET, ORALLY DISINTEGRATING ORAL at 01:11

## 2022-11-23 RX ADMIN — IBUPROFEN 600 MG: 600 TABLET, FILM COATED ORAL at 09:11

## 2022-11-23 NOTE — DISCHARGE SUMMARY
[facility] - 2nd Floor Mother/Baby Unit  Obstetrics  Discharge Summary      Patient Name: Tresa Griffin  MRN: [id number]  Admission Date: 11/21/2022  Hospital Length of Stay: 2 days  Discharge Date and Time:  11/23/2022 8:32 AM  Attending Physician: Gerard Hanson MD   Discharging Provider: Gerard Hanson MD   Primary Care Provider: Jessy Cutler MD    HPI: No notes on file        * No surgery found *     Hospital Course:   No notes on file         Final Active Diagnoses:    Diagnosis Date Noted POA    PRINCIPAL PROBLEM:  Normal labor [O80, Z37.9] 11/23/2022 Not Applicable      Problems Resolved During this Admission:        Significant Diagnostic Studies: Labs: All labs within the past 24 hours have been reviewed      Feeding Method: both breast and bottle    Immunizations       Date Immunization Status Dose Route/Site Given by    11/21/22 0431 MMR Incomplete 0.5 mL Subcutaneous/     11/21/22 0431 Tdap Incomplete 0.5 mL Intramuscular/             Delivery:    Episiotomy: None   Lacerations: None   Repair suture: None   Repair # of packets:     Blood loss (ml):       Birth information:  YOB: 2022   Time of birth: 2:39 AM   Sex: male   Delivery type: Vaginal, Spontaneous   Gestational Age: 38w2d    Delivery Clinician:      Other providers:       Additional  information:  Forceps:    Vacuum:    Breech:    Observed anomalies      Living?:           APGARS  One minute Five minutes Ten minutes   Skin color:         Heart rate:         Grimace:         Muscle tone:         Breathing:         Totals: 9  9        Placenta: Delivered:       appearance  Pending Diagnostic Studies:       None            Discharged Condition: stable    Disposition: Home or Self Care    Follow Up:   Follow-up Information       Gerard Hanson MD. Schedule an appointment as soon as possible for a visit in 4 week(s).    Specialty: Obstetrics and Gynecology  Why: call office to set up appointment.  Contact  information:  [address] [place] [address]  [phone]                           Patient Instructions:   No discharge procedures on file.  Medications:  Current Discharge Medication List        START taking these medications    Details   ibuprofen (ADVIL,MOTRIN) 600 MG tablet Take 1 tablet (600 mg total) by mouth every 6 (six) hours as needed (cramping).  Qty: 30 tablet, Refills: 0      oxyCODONE-acetaminophen (PERCOCET) 5-325 mg per tablet Take 1 tablet by mouth every 6 (six) hours as needed for Pain.  Qty: 15 tablet, Refills: 0    Comments: Quantity prescribed more than 7 day supply? Yes, quantity medically necessary           STOP taking these medications       CLASSIC PRENATAL 28 mg iron- 800 mcg Tab Comments:   Reason for Stopping:         famotidine (PEPCID) 20 MG tablet Comments:   Reason for Stopping:         famotidine (PEPCID) 20 MG tablet Comments:   Reason for Stopping:         ondansetron (ZOFRAN-ODT) 4 MG TbDL Comments:   Reason for Stopping:         promethazine (PHENERGAN) 12.5 MG Tab Comments:   Reason for Stopping:               Gerard Hanson MD  Obstetrics  [facility] - 2nd Floor Mother/Baby Unit

## 2022-11-23 NOTE — LACTATION NOTE
"Experienced Bf mother; reports infant Bf x7 for 5-45" each, hand expressed x1 post-circ and fed ebm.  Infant with 6.4% wt loss, adequate output and low intermediate risk bili.  Mother stated nipples are nontender, intact.  Manual pump given for home use; discharge Bf education done, written info given.   "

## 2022-11-24 PROCEDURE — 11000001 HC ACUTE MED/SURG PRIVATE ROOM

## 2022-11-25 PROCEDURE — 11000001 HC ACUTE MED/SURG PRIVATE ROOM

## 2022-11-26 PROCEDURE — 11000001 HC ACUTE MED/SURG PRIVATE ROOM

## 2022-11-27 PROCEDURE — 11000001 HC ACUTE MED/SURG PRIVATE ROOM

## 2022-11-28 ENCOUNTER — PATIENT OUTREACH (OUTPATIENT)
Dept: ADMINISTRATIVE | Facility: CLINIC | Age: 27
End: 2022-11-28
Payer: MEDICAID

## 2022-11-28 NOTE — PROGRESS NOTES
C3 nurse attempted to contact Tresa Griffin  for a TCC post hospital discharge follow up call. No answer. Left voicemail with callback information. The patient does not have a scheduled HOSFU appointment noted.

## 2022-11-30 ENCOUNTER — HOSPITAL ENCOUNTER (EMERGENCY)
Facility: HOSPITAL | Age: 27
Discharge: HOME | End: 2022-11-30
Attending: OBSTETRICS & GYNECOLOGY
Payer: MEDICAID

## 2022-11-30 VITALS — SYSTOLIC BLOOD PRESSURE: 145 MMHG | DIASTOLIC BLOOD PRESSURE: 72 MMHG | RESPIRATION RATE: 18 BRPM | HEART RATE: 64 BPM

## 2022-11-30 PROBLEM — R52 POSTPARTUM PAIN: Status: ACTIVE | Noted: 2022-11-30

## 2022-11-30 PROBLEM — R11.0 NAUSEA: Status: ACTIVE | Noted: 2022-11-30

## 2022-11-30 LAB
ALBUMIN SERPL-MCNC: 3.5 GM/DL (ref 3.5–5)
ALBUMIN/GLOB SERPL: 1 RATIO (ref 1.1–2)
ALP SERPL-CCNC: 96 UNIT/L (ref 40–150)
ALT SERPL-CCNC: 24 UNIT/L (ref 0–55)
AMYLASE SERPL-CCNC: 48 UNIT/L (ref 25–125)
APPEARANCE UR: CLEAR
AST SERPL-CCNC: 15 UNIT/L (ref 5–34)
BACTERIA #/AREA URNS AUTO: ABNORMAL /HPF
BASOPHILS # BLD AUTO: 0.03 X10(3)/MCL (ref 0–0.2)
BASOPHILS NFR BLD AUTO: 0.3 %
BILIRUB UR QL STRIP.AUTO: NEGATIVE MG/DL
BILIRUBIN DIRECT+TOT PNL SERPL-MCNC: 0.3 MG/DL
BUN SERPL-MCNC: 14.6 MG/DL (ref 7–18.7)
CALCIUM SERPL-MCNC: 9.1 MG/DL (ref 8.4–10.2)
CHLORIDE SERPL-SCNC: 109 MMOL/L (ref 98–107)
CO2 SERPL-SCNC: 21 MMOL/L (ref 22–29)
COLOR UR AUTO: YELLOW
CREAT SERPL-MCNC: 0.72 MG/DL (ref 0.55–1.02)
CREAT UR-MCNC: 76.8 MG/DL (ref 47–110)
EOSINOPHIL # BLD AUTO: 0.04 X10(3)/MCL (ref 0–0.9)
EOSINOPHIL NFR BLD AUTO: 0.4 %
ERYTHROCYTE [DISTWIDTH] IN BLOOD BY AUTOMATED COUNT: 13.7 % (ref 11.5–17)
GFR SERPLBLD CREATININE-BSD FMLA CKD-EPI: >60 MLS/MIN/1.73/M2
GLOBULIN SER-MCNC: 3.4 GM/DL (ref 2.4–3.5)
GLUCOSE SERPL-MCNC: 73 MG/DL (ref 74–100)
GLUCOSE UR QL STRIP.AUTO: NEGATIVE MG/DL
GROUP & RH: NORMAL
HCT VFR BLD AUTO: 35.3 % (ref 37–47)
HGB BLD-MCNC: 11.4 GM/DL (ref 12–16)
IMM GRANULOCYTES # BLD AUTO: 0.07 X10(3)/MCL (ref 0–0.04)
IMM GRANULOCYTES NFR BLD AUTO: 0.8 %
INDIRECT COOMBS GEL: NORMAL
KETONES UR QL STRIP.AUTO: NEGATIVE MG/DL
LDH SERPL-CCNC: 282 U/L (ref 125–220)
LEUKOCYTE ESTERASE UR QL STRIP.AUTO: ABNORMAL UNIT/L
LIPASE SERPL-CCNC: 44 U/L
LYMPHOCYTES # BLD AUTO: 1.76 X10(3)/MCL (ref 0.6–4.6)
LYMPHOCYTES NFR BLD AUTO: 19.3 %
MCH RBC QN AUTO: 27.9 PG (ref 27–31)
MCHC RBC AUTO-ENTMCNC: 32.3 MG/DL (ref 33–36)
MCV RBC AUTO: 86.3 FL (ref 80–94)
MONOCYTES # BLD AUTO: 0.5 X10(3)/MCL (ref 0.1–1.3)
MONOCYTES NFR BLD AUTO: 5.5 %
NEUTROPHILS # BLD AUTO: 6.7 X10(3)/MCL (ref 2.1–9.2)
NEUTROPHILS NFR BLD AUTO: 73.7 %
NITRITE UR QL STRIP.AUTO: NEGATIVE
NRBC BLD AUTO-RTO: 0 %
PH UR STRIP.AUTO: 6 [PH]
PLATELET # BLD AUTO: 310 X10(3)/MCL (ref 130–400)
PMV BLD AUTO: 11 FL (ref 7.4–10.4)
POTASSIUM SERPL-SCNC: 4.4 MMOL/L (ref 3.5–5.1)
PROT SERPL-MCNC: 6.9 GM/DL (ref 6.4–8.3)
PROT UR QL STRIP.AUTO: ABNORMAL MG/DL
PROT UR STRIP-MCNC: 11.7 MG/DL
RBC # BLD AUTO: 4.09 X10(6)/MCL (ref 4.2–5.4)
RBC #/AREA URNS AUTO: 55 /HPF
RBC UR QL AUTO: ABNORMAL UNIT/L
SODIUM SERPL-SCNC: 139 MMOL/L (ref 136–145)
SP GR UR STRIP.AUTO: 1.01 (ref 1–1.03)
SQUAMOUS #/AREA URNS AUTO: <5 /HPF
URINE PROTEIN/CREATININE RATIO (OHS): 0.2
UROBILINOGEN UR STRIP-ACNC: 0.2 MG/DL
WBC # SPEC AUTO: 9.1 X10(3)/MCL (ref 4.5–11.5)
WBC #/AREA URNS AUTO: 20 /HPF

## 2022-11-30 PROCEDURE — 84156 ASSAY OF PROTEIN URINE: CPT | Performed by: OBSTETRICS & GYNECOLOGY

## 2022-11-30 PROCEDURE — 99284 EMERGENCY DEPT VISIT MOD MDM: CPT | Mod: 25

## 2022-11-30 PROCEDURE — 80053 COMPREHEN METABOLIC PANEL: CPT | Performed by: OBSTETRICS & GYNECOLOGY

## 2022-11-30 PROCEDURE — 85025 COMPLETE CBC W/AUTO DIFF WBC: CPT | Performed by: OBSTETRICS & GYNECOLOGY

## 2022-11-30 PROCEDURE — 96375 TX/PRO/DX INJ NEW DRUG ADDON: CPT

## 2022-11-30 PROCEDURE — 81001 URINALYSIS AUTO W/SCOPE: CPT | Performed by: OBSTETRICS & GYNECOLOGY

## 2022-11-30 PROCEDURE — 96374 THER/PROPH/DIAG INJ IV PUSH: CPT

## 2022-11-30 PROCEDURE — 86901 BLOOD TYPING SEROLOGIC RH(D): CPT | Performed by: OBSTETRICS & GYNECOLOGY

## 2022-11-30 PROCEDURE — 63600175 PHARM REV CODE 636 W HCPCS: Performed by: OBSTETRICS & GYNECOLOGY

## 2022-11-30 PROCEDURE — 83690 ASSAY OF LIPASE: CPT | Performed by: OBSTETRICS & GYNECOLOGY

## 2022-11-30 PROCEDURE — 87077 CULTURE AEROBIC IDENTIFY: CPT | Performed by: OBSTETRICS & GYNECOLOGY

## 2022-11-30 PROCEDURE — 82150 ASSAY OF AMYLASE: CPT | Performed by: OBSTETRICS & GYNECOLOGY

## 2022-11-30 PROCEDURE — 83615 LACTATE (LD) (LDH) ENZYME: CPT | Performed by: OBSTETRICS & GYNECOLOGY

## 2022-11-30 RX ORDER — HYDRALAZINE HYDROCHLORIDE 20 MG/ML
10 INJECTION INTRAMUSCULAR; INTRAVENOUS ONCE AS NEEDED
Status: DISCONTINUED | OUTPATIENT
Start: 2022-11-30 | End: 2022-11-30 | Stop reason: HOSPADM

## 2022-11-30 RX ORDER — LABETALOL HCL 20 MG/4 ML
80 SYRINGE (ML) INTRAVENOUS ONCE AS NEEDED
Status: DISCONTINUED | OUTPATIENT
Start: 2022-11-30 | End: 2022-11-30 | Stop reason: HOSPADM

## 2022-11-30 RX ORDER — ONDANSETRON 4 MG/1
4 TABLET, FILM COATED ORAL EVERY 6 HOURS PRN
Qty: 30 TABLET | Refills: 0 | Status: SHIPPED | OUTPATIENT
Start: 2022-11-30

## 2022-11-30 RX ORDER — DEXTROSE, SODIUM CHLORIDE, SODIUM LACTATE, POTASSIUM CHLORIDE, AND CALCIUM CHLORIDE 5; .6; .31; .03; .02 G/100ML; G/100ML; G/100ML; G/100ML; G/100ML
INJECTION, SOLUTION INTRAVENOUS CONTINUOUS
Status: DISCONTINUED | OUTPATIENT
Start: 2022-11-30 | End: 2022-11-30 | Stop reason: HOSPADM

## 2022-11-30 RX ORDER — ONDANSETRON 2 MG/ML
4 INJECTION INTRAMUSCULAR; INTRAVENOUS ONCE
Status: COMPLETED | OUTPATIENT
Start: 2022-11-30 | End: 2022-11-30

## 2022-11-30 RX ORDER — HYDROMORPHONE HYDROCHLORIDE 2 MG/ML
1 INJECTION, SOLUTION INTRAMUSCULAR; INTRAVENOUS; SUBCUTANEOUS ONCE
Status: COMPLETED | OUTPATIENT
Start: 2022-11-30 | End: 2022-11-30

## 2022-11-30 RX ORDER — LABETALOL HCL 20 MG/4 ML
40 SYRINGE (ML) INTRAVENOUS ONCE AS NEEDED
Status: DISCONTINUED | OUTPATIENT
Start: 2022-11-30 | End: 2022-11-30 | Stop reason: HOSPADM

## 2022-11-30 RX ORDER — LABETALOL HCL 20 MG/4 ML
20 SYRINGE (ML) INTRAVENOUS ONCE AS NEEDED
Status: DISCONTINUED | OUTPATIENT
Start: 2022-11-30 | End: 2022-11-30 | Stop reason: HOSPADM

## 2022-11-30 RX ADMIN — ONDANSETRON 4 MG: 2 INJECTION INTRAMUSCULAR; INTRAVENOUS at 11:11

## 2022-11-30 RX ADMIN — SODIUM CHLORIDE, SODIUM LACTATE, POTASSIUM CHLORIDE, CALCIUM CHLORIDE AND DEXTROSE MONOHYDRATE: 5; 600; 310; 30; 20 INJECTION, SOLUTION INTRAVENOUS at 01:11

## 2022-11-30 RX ADMIN — HYDROMORPHONE HYDROCHLORIDE 1 MG: 2 INJECTION INTRAMUSCULAR; INTRAVENOUS; SUBCUTANEOUS at 11:11

## 2022-11-30 NOTE — ED PROVIDER NOTES
LIONEL NOTE  [facility]     Admit Date: 2022  LIONEL Physician: Yadira Matamoros  Primary OBGYN: Dr. Gerard Hanson    Admit Diagnosis/Chief Complaint:  postpartum abdominal pain  Discharge Diagnosis:  viral gastroenteritis    Chief Complaint   Patient presents with    Abdominal Pain    Nausea    Vomiting     Postpartum vaginal delivery on  with c/o abdominal pain, N/V ever since she was discharged on ; pt taking percocet and ibuprofen for pain        Hospital Course:  Tresa Griffin is a 27 y.o.  1 week post (PRECIPITOUS)  (no lacs, note reviewed from 22) who presents with severe abdominal pain. Unrelieved with percocet/ibuprofen. Feeling nauseated, unable to eat. Has been nauseated since delivery.  This IUP is complicated by BMI 41. Lochia is stable, decreasing.  Patient denies fever, cough, rhinitis, sick contacts, new foods or change in diet. She is breast feeding.      /71   Pulse (!) 57   Resp 18   LMP  (LMP Unknown)   Breastfeeding Yes   Temp:  [98 °F (36.7 °C)] 98 °F (36.7 °C)  Pulse:  [57-82] 57  Resp:  [18] 18  BP: (122-165)/() 129/71    General: in no apparent distress well developed and well nourished non-toxic in no respiratory distress and acyanotic alert  Cardiovascular: regular rate and rhythm no murmurs  Respiratory: unlabored  Abdominal: soft, nontender, nondistended, no abnormal masses, no epigastric pain obese   FFBU, nontender  No discharge vaginally, normal light lochia on pad  Back: lumbar tenderness absent CVA tenderness none suprapubic tenderness absent  Extremeties no redness or tenderness in the calves or thighs no edema, 2+ DTRs, no clonus, neg Dee Dee's BLE            Medical Decision Making:  Normal abd U/S  Normal labs, UA    LABS:     Recent Results (from the past 24 hour(s))   Comprehensive Metabolic Panel    Collection Time: 22 11:35 AM   Result Value Ref Range    Sodium Level 139 136 - 145 mmol/L     Potassium Level 4.4 3.5 - 5.1 mmol/L    Chloride 109 (H) 98 - 107 mmol/L    Carbon Dioxide 21 (L) 22 - 29 mmol/L    Glucose Level 73 (L) 74 - 100 mg/dL    Blood Urea Nitrogen 14.6 7.0 - 18.7 mg/dL    Creatinine 0.72 0.55 - 1.02 mg/dL    Calcium Level Total 9.1 8.4 - 10.2 mg/dL    Protein Total 6.9 6.4 - 8.3 gm/dL    Albumin Level 3.5 3.5 - 5.0 gm/dL    Globulin 3.4 2.4 - 3.5 gm/dL    Albumin/Globulin Ratio 1.0 (L) 1.1 - 2.0 ratio    Bilirubin Total 0.3 <=1.5 mg/dL    Alkaline Phosphatase 96 40 - 150 unit/L    Alanine Aminotransferase 24 0 - 55 unit/L    Aspartate Aminotransferase 15 5 - 34 unit/L    eGFR >60 mls/min/1.73/m2   Lactate Dehydrogenase    Collection Time: 11/30/22 11:35 AM   Result Value Ref Range    Lactate Dehydrogenase 282 (H) 125 - 220 U/L   Type & Screen    Collection Time: 11/30/22 11:35 AM   Result Value Ref Range    Group & Rh O POS     Indirect Alysa GEL NEG    Amylase    Collection Time: 11/30/22 11:35 AM   Result Value Ref Range    Amylase Level 48 25 - 125 unit/L   Lipase    Collection Time: 11/30/22 11:35 AM   Result Value Ref Range    Lipase Level 44 <=60 U/L   CBC with Differential    Collection Time: 11/30/22 11:35 AM   Result Value Ref Range    WBC 9.1 4.5 - 11.5 x10(3)/mcL    RBC 4.09 (L) 4.20 - 5.40 x10(6)/mcL    Hgb 11.4 (L) 12.0 - 16.0 gm/dL    Hct 35.3 (L) 37.0 - 47.0 %    MCV 86.3 80.0 - 94.0 fL    MCH 27.9 27.0 - 31.0 pg    MCHC 32.3 (L) 33.0 - 36.0 mg/dL    RDW 13.7 11.5 - 17.0 %    Platelet 310 130 - 400 x10(3)/mcL    MPV 11.0 (H) 7.4 - 10.4 fL    Neut % 73.7 %    Lymph % 19.3 %    Mono % 5.5 %    Eos % 0.4 %    Basophil % 0.3 %    Lymph # 1.76 0.6 - 4.6 x10(3)/mcL    Neut # 6.7 2.1 - 9.2 x10(3)/mcL    Mono # 0.50 0.1 - 1.3 x10(3)/mcL    Eos # 0.04 0 - 0.9 x10(3)/mcL    Baso # 0.03 0 - 0.2 x10(3)/mcL    IG# 0.07 (H) 0 - 0.04 x10(3)/mcL    IG% 0.8 %    NRBC% 0.0 %   Protein/Creatinine Ratio, Urine    Collection Time: 11/30/22 11:45 AM   Result Value Ref Range    Urine Protein  Level 11.7 mg/dL    Urine Creatinine 76.8 47.0 - 110.0 mg/dL    Urine Protein/Creatinine Ratio 0.2        Imaging Results              US Abdomen Limited_Gallbladder (Final result)  Result time 22 12:42:12   Procedure changed from US Abdomen Complete     Final result by Beulah Parada MD (22 12:42:12)                   Impression:      No acute abnormality of the right upper quadrant.      Electronically signed by: Beulah Parada  Date:    2022  Time:    12:42               Narrative:    EXAMINATION:  US ABDOMEN LIMITED_GALLBLADDER    CLINICAL HISTORY:  abdominal pain/nausea vomiting/gallstones;    TECHNIQUE:  Gray-scale and color Doppler images of the right upper quadrant.    COMPARISON:  None    FINDINGS:  The pancreas is homogeneous. The visualized portions of the abdominal aorta and IVC are unremarkable.    The liver measures 15.1 cm. Hepatic echogenicity is normal. No focal liver mass identified.  The main portal vein is patent with hepatopetal flow. No ascites.    The gallbladder has normal wall thickness without discrete stone. The common bile duct measures 0.5 cm. Sonographic Celestin sign is negative.    The right kidney measures 14.1 cm.  No hydronephrosis.                                       ASSESMENT: Tresa Griffin is a 27 y.o.   1 week postpartum with (BP initially elevated likely due to patient anxiety)    Discharge Diagnosis:   Patient Active Problem List   Diagnosis    Normal labor    Postpartum pain    Nausea       Status:Improved    Disposition:  discharged to home    Medications:   IV zofran, IV dilaudid  IV fluids (LR then D5LR)    Patient Instructions:   - Pt was given routine postpartum instructions including fevers/heavy bleeding/severe features of preeclampsia symptoms or increased pain. Rx sent for oral zofran home use.       Yadira Matamoros MD  OB/GYN Hospitalist  11:24 AM 2022

## 2022-12-02 LAB — BACTERIA UR CULT: NORMAL

## 2022-12-05 ENCOUNTER — HOSPITAL ENCOUNTER (OUTPATIENT)
Dept: RADIOLOGY | Facility: HOSPITAL | Age: 27
Discharge: HOME | End: 2022-12-05
Attending: OBSTETRICS & GYNECOLOGY
Payer: MEDICAID

## 2022-12-05 DIAGNOSIS — R11.10 VOMITING, UNSPECIFIED VOMITING TYPE, UNSPECIFIED WHETHER NAUSEA PRESENT: ICD-10-CM

## 2022-12-05 DIAGNOSIS — R10.11 RIGHT UPPER QUADRANT ABDOMINAL PAIN: ICD-10-CM

## 2022-12-05 PROCEDURE — 76700 US EXAM ABDOM COMPLETE: CPT | Mod: TC

## 2023-02-27 PROBLEM — Z37.9 NORMAL LABOR: Status: RESOLVED | Noted: 2022-11-23 | Resolved: 2023-02-27

## 2023-06-28 ENCOUNTER — HOSPITAL ENCOUNTER (EMERGENCY)
Facility: HOSPITAL | Age: 28
Discharge: HOME | End: 2023-06-28
Attending: STUDENT IN AN ORGANIZED HEALTH CARE EDUCATION/TRAINING PROGRAM
Payer: MEDICAID

## 2023-06-28 VITALS
DIASTOLIC BLOOD PRESSURE: 86 MMHG | HEIGHT: 64 IN | HEART RATE: 87 BPM | SYSTOLIC BLOOD PRESSURE: 138 MMHG | OXYGEN SATURATION: 100 % | RESPIRATION RATE: 18 BRPM | BODY MASS INDEX: 39.27 KG/M2 | WEIGHT: 230 LBS | TEMPERATURE: 98 F

## 2023-06-28 DIAGNOSIS — N30.00 ACUTE CYSTITIS WITHOUT HEMATURIA: ICD-10-CM

## 2023-06-28 DIAGNOSIS — R00.2 PALPITATIONS: ICD-10-CM

## 2023-06-28 DIAGNOSIS — H81.399 PERIPHERAL VERTIGO, UNSPECIFIED LATERALITY: Primary | ICD-10-CM

## 2023-06-28 DIAGNOSIS — R42 DIZZY: ICD-10-CM

## 2023-06-28 LAB
ALBUMIN SERPL-MCNC: 4.3 G/DL (ref 3.5–5)
ALBUMIN/GLOB SERPL: 1.1 RATIO (ref 1.1–2)
ALP SERPL-CCNC: 82 UNIT/L (ref 40–150)
ALT SERPL-CCNC: 13 UNIT/L (ref 0–55)
APPEARANCE UR: ABNORMAL
AST SERPL-CCNC: 15 UNIT/L (ref 5–34)
B-HCG SERPL QL: NEGATIVE
BACTERIA #/AREA URNS AUTO: ABNORMAL /HPF
BASOPHILS # BLD AUTO: 0.02 X10(3)/MCL
BASOPHILS NFR BLD AUTO: 0.2 %
BILIRUB UR QL STRIP.AUTO: NEGATIVE MG/DL
BILIRUBIN DIRECT+TOT PNL SERPL-MCNC: 0.3 MG/DL
BUN SERPL-MCNC: 15.9 MG/DL (ref 7–18.7)
CALCIUM SERPL-MCNC: 9.7 MG/DL (ref 8.4–10.2)
CHLORIDE SERPL-SCNC: 106 MMOL/L (ref 98–107)
CO2 SERPL-SCNC: 25 MMOL/L (ref 22–29)
COLOR UR: YELLOW
CREAT SERPL-MCNC: 0.84 MG/DL (ref 0.55–1.02)
EOSINOPHIL # BLD AUTO: 0.06 X10(3)/MCL (ref 0–0.9)
EOSINOPHIL NFR BLD AUTO: 0.6 %
ERYTHROCYTE [DISTWIDTH] IN BLOOD BY AUTOMATED COUNT: 13.7 % (ref 11.5–17)
FLUAV AG UPPER RESP QL IA.RAPID: NOT DETECTED
FLUBV AG UPPER RESP QL IA.RAPID: NOT DETECTED
GFR SERPLBLD CREATININE-BSD FMLA CKD-EPI: >60 MLS/MIN/1.73/M2
GLOBULIN SER-MCNC: 4 GM/DL (ref 2.4–3.5)
GLUCOSE SERPL-MCNC: 85 MG/DL (ref 74–100)
GLUCOSE UR QL STRIP.AUTO: NEGATIVE MG/DL
HCT VFR BLD AUTO: 42.4 % (ref 37–47)
HGB BLD-MCNC: 13.6 G/DL (ref 12–16)
IMM GRANULOCYTES # BLD AUTO: 0.01 X10(3)/MCL (ref 0–0.04)
IMM GRANULOCYTES NFR BLD AUTO: 0.1 %
KETONES UR QL STRIP.AUTO: NEGATIVE MG/DL
LEUKOCYTE ESTERASE UR QL STRIP.AUTO: ABNORMAL UNIT/L
LYMPHOCYTES # BLD AUTO: 2.37 X10(3)/MCL (ref 0.6–4.6)
LYMPHOCYTES NFR BLD AUTO: 24.2 %
MAGNESIUM SERPL-MCNC: 2 MG/DL (ref 1.6–2.6)
MCH RBC QN AUTO: 27.3 PG (ref 27–31)
MCHC RBC AUTO-ENTMCNC: 32.1 G/DL (ref 33–36)
MCV RBC AUTO: 85 FL (ref 80–94)
MONOCYTES # BLD AUTO: 0.47 X10(3)/MCL (ref 0.1–1.3)
MONOCYTES NFR BLD AUTO: 4.8 %
NEUTROPHILS # BLD AUTO: 6.87 X10(3)/MCL (ref 2.1–9.2)
NEUTROPHILS NFR BLD AUTO: 70.1 %
NITRITE UR QL STRIP.AUTO: NEGATIVE
PH UR STRIP.AUTO: 6 [PH]
PLATELET # BLD AUTO: 286 X10(3)/MCL (ref 130–400)
PMV BLD AUTO: 10.7 FL (ref 7.4–10.4)
POCT GLUCOSE: 83 MG/DL (ref 70–110)
POTASSIUM SERPL-SCNC: 4.1 MMOL/L (ref 3.5–5.1)
PROT SERPL-MCNC: 8.3 GM/DL (ref 6.4–8.3)
PROT UR QL STRIP.AUTO: NEGATIVE MG/DL
RBC # BLD AUTO: 4.99 X10(6)/MCL (ref 4.2–5.4)
RBC #/AREA URNS AUTO: ABNORMAL /HPF
RBC UR QL AUTO: NEGATIVE UNIT/L
SARS-COV-2 RNA RESP QL NAA+PROBE: NOT DETECTED
SODIUM SERPL-SCNC: 140 MMOL/L (ref 136–145)
SP GR UR STRIP.AUTO: >=1.03
SQUAMOUS #/AREA URNS AUTO: ABNORMAL /HPF
TROPONIN I SERPL-MCNC: 0.02 NG/ML (ref 0–0.04)
TSH SERPL-ACNC: 1.05 UIU/ML (ref 0.35–4.94)
UROBILINOGEN UR STRIP-ACNC: 0.2 MG/DL
WBC # SPEC AUTO: 9.8 X10(3)/MCL (ref 4.5–11.5)
WBC #/AREA URNS AUTO: ABNORMAL /HPF

## 2023-06-28 PROCEDURE — 81025 URINE PREGNANCY TEST: CPT | Performed by: STUDENT IN AN ORGANIZED HEALTH CARE EDUCATION/TRAINING PROGRAM

## 2023-06-28 PROCEDURE — 93005 ELECTROCARDIOGRAM TRACING: CPT

## 2023-06-28 PROCEDURE — 81001 URINALYSIS AUTO W/SCOPE: CPT | Performed by: STUDENT IN AN ORGANIZED HEALTH CARE EDUCATION/TRAINING PROGRAM

## 2023-06-28 PROCEDURE — 99284 EMERGENCY DEPT VISIT MOD MDM: CPT

## 2023-06-28 PROCEDURE — 83735 ASSAY OF MAGNESIUM: CPT | Performed by: STUDENT IN AN ORGANIZED HEALTH CARE EDUCATION/TRAINING PROGRAM

## 2023-06-28 PROCEDURE — 84484 ASSAY OF TROPONIN QUANT: CPT | Performed by: STUDENT IN AN ORGANIZED HEALTH CARE EDUCATION/TRAINING PROGRAM

## 2023-06-28 PROCEDURE — 84443 ASSAY THYROID STIM HORMONE: CPT | Performed by: STUDENT IN AN ORGANIZED HEALTH CARE EDUCATION/TRAINING PROGRAM

## 2023-06-28 PROCEDURE — 82962 GLUCOSE BLOOD TEST: CPT

## 2023-06-28 PROCEDURE — 0240U COVID/FLU A&B PCR: CPT | Performed by: STUDENT IN AN ORGANIZED HEALTH CARE EDUCATION/TRAINING PROGRAM

## 2023-06-28 PROCEDURE — 25000003 PHARM REV CODE 250: Performed by: STUDENT IN AN ORGANIZED HEALTH CARE EDUCATION/TRAINING PROGRAM

## 2023-06-28 PROCEDURE — 80053 COMPREHEN METABOLIC PANEL: CPT | Performed by: STUDENT IN AN ORGANIZED HEALTH CARE EDUCATION/TRAINING PROGRAM

## 2023-06-28 PROCEDURE — 85025 COMPLETE CBC W/AUTO DIFF WBC: CPT | Performed by: STUDENT IN AN ORGANIZED HEALTH CARE EDUCATION/TRAINING PROGRAM

## 2023-06-28 RX ORDER — MECLIZINE HYDROCHLORIDE 25 MG/1
25 TABLET ORAL
Status: COMPLETED | OUTPATIENT
Start: 2023-06-28 | End: 2023-06-28

## 2023-06-28 RX ORDER — NITROFURANTOIN 25; 75 MG/1; MG/1
100 CAPSULE ORAL 2 TIMES DAILY
Qty: 14 CAPSULE | Refills: 0 | Status: SHIPPED | OUTPATIENT
Start: 2023-06-28 | End: 2023-07-05

## 2023-06-28 RX ORDER — MECLIZINE HYDROCHLORIDE 25 MG/1
25 TABLET ORAL 3 TIMES DAILY PRN
Qty: 10 TABLET | Refills: 0 | Status: SHIPPED | OUTPATIENT
Start: 2023-06-28

## 2023-06-28 RX ADMIN — MECLIZINE HYDROCHLORIDE 25 MG: 25 TABLET ORAL at 03:06

## 2023-06-28 NOTE — ED PROVIDER NOTES
Encounter Date: 6/28/2023       History     Chief Complaint   Patient presents with    Headache     Patient reports headache been feeling weak at times feels as though gonna pass out times two weeks . Cbg 83     Patient is a 27-year-old  female no significant past medical history presented to the ER today due to dizziness intermittently for the last 2 weeks.  Patient states mainly associated with movement.  Patient denies any syncopal episodes.  Patient denies any diplopia, dysarthria, dysphagia, focal deficits changes in sensation.  Patient states she has a palpitations from time to time.  Patient states all symptoms seemed to resolve without much intervention.  She denies any fevers, chills, cough, congestion, chest pain shortness of breath abdominal pain, diarrhea, constipation, dysuria, hematuria.  Patient has tried nothing for symptoms.    Review of patient's allergies indicates:  No Known Allergies  No past medical history on file.  No past surgical history on file.  Family History   Problem Relation Age of Onset    Hypertension Mother      Social History     Tobacco Use    Smoking status: Never    Smokeless tobacco: Never   Substance Use Topics    Alcohol use: Never    Drug use: Never     Review of Systems   Constitutional:  Negative for chills, fatigue and fever.   HENT:  Negative for congestion, sore throat and trouble swallowing.    Eyes:  Negative for pain and visual disturbance.   Respiratory:  Negative for cough, shortness of breath and wheezing.    Cardiovascular:  Positive for palpitations. Negative for chest pain.   Gastrointestinal:  Negative for abdominal pain, blood in stool, constipation, diarrhea, nausea and vomiting.   Genitourinary:  Negative for dysuria and hematuria.   Musculoskeletal:  Negative for back pain and myalgias.   Skin:  Negative for rash and wound.   Neurological:  Positive for dizziness. Negative for tremors, seizures, syncope, facial asymmetry, speech  difficulty, weakness, light-headedness, numbness and headaches.   Psychiatric/Behavioral:  Negative for confusion. The patient is not nervous/anxious.      Physical Exam     Initial Vitals [06/28/23 1302]   BP Pulse Resp Temp SpO2   (!) 151/92 66 18 98 °F (36.7 °C) 100 %      MAP       --         Physical Exam    Nursing note and vitals reviewed.  Constitutional: She appears well-developed and well-nourished. She is not diaphoretic. No distress.   HENT:   Head: Normocephalic.   Right Ear: External ear normal.   Left Ear: External ear normal.   Nose: Nose normal.   Eyes: Conjunctivae and EOM are normal. Right eye exhibits no discharge. Left eye exhibits no discharge. No scleral icterus.   Neck:   Normal range of motion.  Cardiovascular:  Normal rate, regular rhythm and normal heart sounds.     Exam reveals no gallop and no friction rub.       No murmur heard.  Pulmonary/Chest: Breath sounds normal. No stridor. No respiratory distress. She has no wheezes. She has no rhonchi. She has no rales.   Musculoskeletal:         General: Normal range of motion.      Cervical back: Normal range of motion.     Neurological: She is alert and oriented to person, place, and time. She has normal strength. No cranial nerve deficit or sensory deficit. GCS score is 15. GCS eye subscore is 4. GCS verbal subscore is 5. GCS motor subscore is 6.   CN II-XII intact bilaterally, PERRLA, EOMI, AO, no facial asymmetry noted, no abnormalities of vision loss or peripheral vision loss, strength 5/5 x 4 extremities, sensation intact throughout, no focal neurological deficits noted on exam.  Gait stable without assistance. Negative Pronator drift bilaterally.       Skin: Skin is warm. No rash noted. No erythema.   Psychiatric: She has a normal mood and affect. Her behavior is normal.       ED Course   Procedures  Labs Reviewed   URINALYSIS, REFLEX TO URINE CULTURE - Abnormal; Notable for the following components:       Result Value    Appearance, UA  Slightly Cloudy (*)     Leukocyte Esterase, UA Small (*)     All other components within normal limits   COMPREHENSIVE METABOLIC PANEL - Abnormal; Notable for the following components:    Globulin 4.0 (*)     All other components within normal limits   CBC WITH DIFFERENTIAL - Abnormal; Notable for the following components:    MCHC 32.1 (*)     MPV 10.7 (*)     All other components within normal limits   URINALYSIS, MICROSCOPIC - Abnormal; Notable for the following components:    Bacteria, UA 2+ (*)     Squamous Epithelial Cells, UA Moderate (*)     All other components within normal limits   COVID/FLU A&B PCR - Normal    Narrative:     The Xpert Xpress SARS-CoV-2/FLU/RSV plus is a rapid, multiplexed real-time PCR test intended for the simultaneous qualitative detection and differentiation of SARS-CoV-2, Influenza A, Influenza B, and respiratory syncytial virus (RSV) viral RNA in either nasopharyngeal swab or nasal swab specimens.         HCG QUALITATIVE URINE - Normal   TSH - Normal   MAGNESIUM - Normal   TROPONIN I - Normal   CBC W/ AUTO DIFFERENTIAL    Narrative:     The following orders were created for panel order CBC Auto Differential.  Procedure                               Abnormality         Status                     ---------                               -----------         ------                     CBC with Differential[[id number]]        Abnormal            Final result                 Please view results for these tests on the individual orders.   POCT GLUCOSE     EKG Readings: (Independently Interpreted)   Initial Reading: No STEMI. Rhythm: Normal Sinus Rhythm. Heart Rate: 74. Ectopy: No Ectopy. Conduction: Normal. ST Segments: Normal ST Segments. Axis: Normal.     Imaging Results    None          Medications   meclizine tablet 25 mg (25 mg Oral Given 6/28/23 5139)     Medical Decision Making:   Initial Assessment:   Overall well-appearing 27-year-old female no acute distress  Differential Diagnosis:    Positional vertigo/BPPV, TIA, CVA, thyroid disorder, just arrhythmia, electrolyte disturbance, dehydration, orthostatic vertigo  Clinical Tests:   Lab Tests: Ordered and Reviewed  The following lab test(s) were unremarkable: CBC, CMP, Urinalysis and UPT  Medical Tests: Reviewed and Ordered  ED Management:  Vital signs stable patient is afebrile  COVID and flu both negative   Patient denies any red flag symptoms of a central dizziness cause  Symptoms all seem to be consistent with a peripheral cause  Vertigo suspected  Meclizine since pharmacy   Labs reassuring UA concerning for possible UTI this Macrobid sent to pharmacy  Palpitations no dysrhythmia on EKG, abnormality of electrolytes, abnormality of thyroid function  Advised patient of the next step for this would likely be a Holter monitor and advised her to discuss with the primary care doctor which she agreed   All questions were answered in layman's terms  Return precautions were discussed and follow up with PCP is recommended                        Clinical Impression:   Final diagnoses:  [R42] Dizzy  [H81.399] Peripheral vertigo, unspecified laterality (Primary)  [N30.00] Acute cystitis without hematuria  [R00.2] Palpitations        ED Disposition Condition    Discharge Stable          ED Prescriptions       Medication Sig Dispense Start Date End Date Auth. Provider    nitrofurantoin, macrocrystal-monohydrate, (MACROBID) 100 MG capsule Take 1 capsule (100 mg total) by mouth 2 (two) times daily. for 7 days 14 capsule 6/28/2023 7/5/2023 Juan Pablo Dunham MD    meclizine (ANTIVERT) 25 mg tablet Take 1 tablet (25 mg total) by mouth 3 (three) times daily as needed for Dizziness. 10 tablet 6/28/2023 -- Juan Pablo Dunham MD          Follow-up Information       Follow up With Specialties Details Why Contact Info    [facility] Emergency [facility]  If symptoms worsen [address] [facility] [address] [phone]    Jessy STARR  MD [facility] Schedule an appointment as soon as possible for a visit   [address] [facility] [id number] [phone]               Juan Pablo Dunham MD  06/28/23 8590

## 2023-07-20 ENCOUNTER — OFFICE VISIT (OUTPATIENT)
Dept: FAMILY MEDICINE | Facility: CLINIC | Age: 28
End: 2023-07-20
Payer: MEDICAID

## 2023-07-20 VITALS
HEIGHT: 64 IN | SYSTOLIC BLOOD PRESSURE: 133 MMHG | BODY MASS INDEX: 42.03 KG/M2 | OXYGEN SATURATION: 100 % | HEART RATE: 68 BPM | RESPIRATION RATE: 18 BRPM | DIASTOLIC BLOOD PRESSURE: 86 MMHG | WEIGHT: 246.19 LBS | TEMPERATURE: 98 F

## 2023-07-20 DIAGNOSIS — L40.8 SEBORRHEIC PSORIASIS: Primary | ICD-10-CM

## 2023-07-20 PROCEDURE — 99213 OFFICE O/P EST LOW 20 MIN: CPT | Mod: PBBFAC | Performed by: DERMATOLOGY

## 2023-07-20 RX ORDER — KETOCONAZOLE 20 MG/ML
SHAMPOO, SUSPENSION TOPICAL WEEKLY
Qty: 120 ML | Refills: 5 | Status: SHIPPED | OUTPATIENT
Start: 2023-07-20 | End: 2023-09-07 | Stop reason: SDUPTHER

## 2023-07-20 RX ORDER — FLUOCINOLONE ACETONIDE 0.1 MG/ML
SOLUTION TOPICAL NIGHTLY
Qty: 60 ML | Refills: 5 | Status: SHIPPED | OUTPATIENT
Start: 2023-07-20 | End: 2023-09-07 | Stop reason: SDUPTHER

## 2023-07-20 RX ORDER — FLUTICASONE PROPIONATE 0.5 MG/G
CREAM TOPICAL NIGHTLY
Qty: 60 G | Refills: 5 | Status: SHIPPED | OUTPATIENT
Start: 2023-07-20 | End: 2023-09-07 | Stop reason: SDUPTHER

## 2023-07-20 NOTE — PROGRESS NOTES
"Subjective:       Patient ID: Tresa Griffin is a 27 y.o. female.    Chief Complaint: Rash (psoriasis)      Rash    Patient with plaque psoriasis diagnosed by previous dermatologist presenting today to establish care. Says she's had psoriasis since she was 11 years old. Mainly on face, scalp, back, breasts, vaginal areas. Moderate itching, episode, no pain. Family history of psoriasis in her sister. She denies any arthritic pain.     Review of Systems   Integumentary:  Positive for rash.       Objective:       Vital Signs  Temp: 97.9 °F (36.6 °C)  Temp Source: Oral  Pulse: 68  Resp: 18  SpO2: 100 %  BP: 133/86  BP Location: Right arm  Patient Position: Sitting  Pain Score: 0-No pain  Height and Weight  Height: 5' 4" (162.6 cm)  Weight: 111.7 kg (246 lb 3.2 oz)  BSA (Calculated - sq m): 2.25 sq meters  BMI (Calculated): 42.2  Weight in (lb) to have BMI = 25: 145.3]  Physical Exam  HENT:      Head: Atraumatic.   Skin:     Comments: Scalp:  fine, white, diffuse scaliness without underlying erythema     Face: pink and grey ashiness on forehead, nasolabial folds, extending to malar areas   Neurological:      Mental Status: She is alert.   Psychiatric:         Mood and Affect: Mood normal.         Behavior: Behavior normal.       Assessment:       Problem List Items Addressed This Visit    None  Visit Diagnoses       Seborrheic psoriasis    -  Primary    Relevant Medications    ketoconazole (NIZORAL) 2 % shampoo    fluticasone propionate (CUTIVATE) 0.05 % cream    fluocinolone (SYNALAR) 0.01 % external solution            Plan:       - Ketoconazole shampoo - T-gel shampoo regimen: Shampoo every 3 day alternating these shampoos  - Cutivate cream .05%: Apply nightly until clear, then as needed  - Synalar solution .01%: Apply nightly until clear, then as needed  - Follow up in 1 month     "

## 2023-08-02 ENCOUNTER — OFFICE VISIT (OUTPATIENT)
Dept: FAMILY MEDICINE | Facility: CLINIC | Age: 28
End: 2023-08-02
Payer: MEDICAID

## 2023-08-02 VITALS
DIASTOLIC BLOOD PRESSURE: 80 MMHG | SYSTOLIC BLOOD PRESSURE: 120 MMHG | HEART RATE: 65 BPM | OXYGEN SATURATION: 97 % | TEMPERATURE: 99 F | WEIGHT: 251 LBS | BODY MASS INDEX: 42.85 KG/M2 | RESPIRATION RATE: 19 BRPM | HEIGHT: 64 IN

## 2023-08-02 DIAGNOSIS — Z30.017 ENCOUNTER FOR INITIAL PRESCRIPTION OF IMPLANTABLE SUBDERMAL CONTRACEPTIVE: Primary | ICD-10-CM

## 2023-08-02 LAB
B-HCG UR QL: NEGATIVE
CTP QC/QA: YES

## 2023-08-02 PROCEDURE — 81025 URINE PREGNANCY TEST: CPT | Mod: PBBFAC | Performed by: STUDENT IN AN ORGANIZED HEALTH CARE EDUCATION/TRAINING PROGRAM

## 2023-08-02 PROCEDURE — 11981 INSERTION DRUG DLVR IMPLANT: CPT | Mod: PBBFAC | Performed by: STUDENT IN AN ORGANIZED HEALTH CARE EDUCATION/TRAINING PROGRAM

## 2023-08-02 PROCEDURE — 99214 OFFICE O/P EST MOD 30 MIN: CPT | Mod: PBBFAC | Performed by: STUDENT IN AN ORGANIZED HEALTH CARE EDUCATION/TRAINING PROGRAM

## 2023-08-02 RX ORDER — LIDOCAINE HYDROCHLORIDE AND EPINEPHRINE 10; 10 MG/ML; UG/ML
10 INJECTION, SOLUTION INFILTRATION; PERINEURAL
Status: DISCONTINUED | OUTPATIENT
Start: 2023-08-02 | End: 2023-08-02

## 2023-08-02 RX ORDER — LIDOCAINE HYDROCHLORIDE 10 MG/ML
10 INJECTION, SOLUTION EPIDURAL; INFILTRATION; INTRACAUDAL; PERINEURAL
Status: COMPLETED | OUTPATIENT
Start: 2023-08-02 | End: 2023-08-02

## 2023-08-02 RX ORDER — SERTRALINE HYDROCHLORIDE 25 MG/1
25 TABLET, FILM COATED ORAL
COMMUNITY
Start: 2023-07-17

## 2023-08-02 RX ORDER — SUMATRIPTAN 50 MG/1
50 TABLET, FILM COATED ORAL
COMMUNITY
Start: 2023-07-17

## 2023-08-02 RX ADMIN — ETONOGESTREL 68 MG: 68 IMPLANT SUBCUTANEOUS at 09:08

## 2023-08-02 RX ADMIN — LIDOCAINE HYDROCHLORIDE 100 MG: 10 INJECTION, SOLUTION EPIDURAL; INFILTRATION; INTRACAUDAL; PERINEURAL at 10:08

## 2023-08-02 NOTE — PROGRESS NOTES
[facility]    Subjective     Patient ID: Tresa Griffin is a 28 y.o. female.    Chief Complaint: Contraception (Nexplanon)    Pt presents for contraception management with interest in Nexplanon placement.       PMH: none  LMP: 23  Menses: regular prior to pregnancy 8 months ago  Previous contraception: Patch  STD: trichomonas remote.     Review of Systems   Constitutional:  Negative for activity change, appetite change, fatigue and fever.   Eyes:  Negative for visual disturbance.   Respiratory:  Negative for shortness of breath.    Cardiovascular:  Negative for chest pain and palpitations.   Gastrointestinal:  Negative for abdominal pain, change in bowel habit and change in bowel habit.   Genitourinary:  Negative for pelvic pain and vaginal discharge.   Neurological:  Negative for headaches.          Objective   Vitals:    23 0944   BP: 120/80   Pulse: 65   Resp: 19   Temp: 98.6 °F (37 °C)       Physical Exam  Constitutional:       General: She is not in acute distress.     Appearance: Normal appearance. She is not ill-appearing.   Cardiovascular:      Rate and Rhythm: Normal rate and regular rhythm.   Pulmonary:      Effort: Pulmonary effort is normal.      Breath sounds: Normal breath sounds.   Skin:     General: Skin is warm and dry.   Neurological:      Mental Status: She is alert.       POC Preg Test, Ur Negative Negative            Insertion of Nexplanon    Date/Time: 2023 9:40 AM    Performed by: Aileen Bush MD  Authorized by: Jessy Cutler MD    Consent obtained:  Written and verbal  Consent given by:  Patient  Patient questions answered: yes    Patient agrees, verbalizes understanding, and wants to proceed: yes    Educational handouts given: yes    Pre-procedure timeout performed: yes    Local anesthetic:  Lidocaine 1%  The site was cleaned and prepped in a sterile fashion: yes    Small stab incision was made in arm: yes    Left/right:  Left   68 mg etonogestreL  68 mg  Preloaded Implanon trocar was placed subdermally: yes    Visualization of implant was obtained: yes    Nexplanon was inserted and trocar removed: yes    Visualization of notch in stilette and palpitation of device: yes    Palpitation confirms placement by provider and patient: yes           Assessment and Plan     1. Encounter for initial prescription of implantable subdermal contraceptive  -     POCT urine pregnancy  -     Insertion of Nexplanon Device; Future  -     etonogestreL subdermal device 68 mg    Other orders  -     LIDOcaine-EPINEPHrine 1%-1:100,000 injection 10 mL      Nexplanon hand out provided. Risk benefits discussed prior to placement.   Tolerated procedure well.   Instructed for compression bandage x 24 hrs.   May take Tylenol PRN pain  Return precautions discussed. Pt to call for return appt if needed.     RTC PRN    Aileen Bush M.D.  [facility] HO-3         Follow up if symptoms worsen or fail to improve.

## 2023-08-02 NOTE — PROGRESS NOTES
Patient was seen and evaluated with the resident on the DOS.   Services were provided at a teaching facility.   Patient here for contraception (Nexplanon). UPT negative.   I was present during the procedure. No complications.   I have reviewed the resident's HPI and PE.   The assessment, plan, and management are reasonable and appropriate.

## 2023-08-03 NOTE — PROGRESS NOTES
I saw the patient with the resident physician, evaluated and recommended treatment.    Peña Quintero MD

## 2023-08-04 ENCOUNTER — DOCUMENTATION ONLY (OUTPATIENT)
Dept: ADMINISTRATIVE | Facility: HOSPITAL | Age: 28
End: 2023-08-04
Payer: MEDICAID

## 2023-09-07 ENCOUNTER — OFFICE VISIT (OUTPATIENT)
Dept: FAMILY MEDICINE | Facility: CLINIC | Age: 28
End: 2023-09-07
Payer: MEDICAID

## 2023-09-07 VITALS
SYSTOLIC BLOOD PRESSURE: 124 MMHG | OXYGEN SATURATION: 100 % | HEART RATE: 83 BPM | DIASTOLIC BLOOD PRESSURE: 84 MMHG | HEIGHT: 64 IN | WEIGHT: 246.63 LBS | BODY MASS INDEX: 42.11 KG/M2 | RESPIRATION RATE: 18 BRPM | TEMPERATURE: 98 F

## 2023-09-07 DIAGNOSIS — L40.8 SEBORRHEIC PSORIASIS: ICD-10-CM

## 2023-09-07 DIAGNOSIS — L40.8 SEBOPSORIASIS: Primary | ICD-10-CM

## 2023-09-07 PROBLEM — L21.9 SEBORRHEIC DERMATITIS: Status: ACTIVE | Noted: 2023-09-07

## 2023-09-07 PROCEDURE — 99214 OFFICE O/P EST MOD 30 MIN: CPT | Mod: PBBFAC | Performed by: DERMATOLOGY

## 2023-09-07 RX ORDER — DOXYLAMINE SUCCINATE 25 MG
TABLET ORAL 2 TIMES DAILY
Refills: 0
Start: 2023-09-07

## 2023-09-07 RX ORDER — FLUOCINOLONE ACETONIDE 0.1 MG/ML
SOLUTION TOPICAL NIGHTLY
Qty: 60 ML | Refills: 5 | Status: SHIPPED | OUTPATIENT
Start: 2023-09-07

## 2023-09-07 RX ORDER — KETOCONAZOLE 20 MG/ML
SHAMPOO, SUSPENSION TOPICAL WEEKLY
Qty: 120 ML | Refills: 5 | Status: SHIPPED | OUTPATIENT
Start: 2023-09-07

## 2023-09-07 RX ORDER — FLUTICASONE PROPIONATE 0.5 MG/G
CREAM TOPICAL NIGHTLY
Qty: 60 G | Refills: 5 | Status: SHIPPED | OUTPATIENT
Start: 2023-09-07

## 2023-09-07 NOTE — PATIENT INSTRUCTIONS
Over the counter Miconazole 2% mix half and half with fluticasone and apply to non hairy areas of sebopsorias twice a day (morning and night)    Continue Ketoconazole shampoo - T-gel shampoo regimen: Shampoo every 3 day alternating these shampoos on scalp, face and ears

## 2023-09-07 NOTE — PROGRESS NOTES
Chief Complaint  Psoriasis      History of Present Illness  Tresa Griffin is a 28 y.o. female presents to [facility] for seborrheic psoriasis f/u; last seen 7/20/2023 initiated ketoconazole 2% shampoo, Cutivate 0.05% cream and Synalar 0.01% solution. Today reports scalp is much improved responding to regimen but no improvement in skin. Still requiring daily application Cutivate and Synalar, has active flares if without daily application. Not applying ketoconazole shampoo to face. Active areas include face, scalp, back, breasts, groin. Moderate itching, episode, no pain. She denies any arthritic pain or morning stiffness.       Review of Systems   Constitutional:  Negative for chills and fever.   Musculoskeletal:  Negative for joint pain.         Physical Exam    Vitals:    09/07/23 0817   BP: 124/84   Pulse: 83   Resp: 18   Temp: 98.4 °F (36.9 °C)     Wt Readings from Last 2 Encounters:   09/07/23 111.9 kg (246 lb 9.6 oz)   08/02/23 113.9 kg (251 lb)     Gen: NAD  Skin: Scalp: No fine scaliness, hypopigmentation along anterior hairline, Posterior hairline with erythematous scaly plaques. Face: diffuse hypopigmentation with overlying scaling on eyebrows and nasolabial folds and ears      Current Outpatient Medications  Current Outpatient Medications   Medication Instructions    famotidine (PEPCID) 20 mg, Oral, Daily    fluocinolone (SYNALAR) 0.01 % external solution Topical (Top), Nightly, Apply nightly to hairy areas until clear. Then apply as needed    fluticasone propionate (CUTIVATE) 0.05 % cream Topical (Top), Nightly, Apply to non-hairy surfaces nightly until clear. Then apply as needed.    ibuprofen (ADVIL,MOTRIN) 600 mg, Oral, Every 6 hours PRN    ketoconazole (NIZORAL) 2 % shampoo Topical (Top), Weekly, Shampoo every 3rd day. Alternate Ketoconazole shampoo with OTC T-gel shampoo. Lather for 5 minutes.    meclizine (ANTIVERT) 25 mg, Oral, 3 times daily PRN    ondansetron (ZOFRAN) 4 mg, Oral, Every 6  hours PRN    oxyCODONE-acetaminophen (PERCOCET) 5-325 mg per tablet 1 tablet, Oral, Every 6 hours PRN    promethazine (PHENERGAN) 12.5 mg, Oral, 2 times daily    promethazine (PHENERGAN) 12.5 mg, Oral, 2 times daily    sertraline (ZOLOFT) 25 mg, Oral    sumatriptan (IMITREX) 50 mg, Oral, As needed (PRN)             Assessment / Plan:    Sebopsoriasis    - Refilled and continued Ketoconazole shampoo - T-gel shampoo regimen: Shampoo every 3 day work it up into a lather for 5 mins, apply then rinse off. Alternating these shampoos on scalp, face and ears  - Start over the counter Miconazole 2%. Mix half and half ratio with fluticasone and apply to non hairy areas of sebopsorias twice a day (morning and night)  - Refilled and continued Cutivate cream .05%: Apply nightly until clear, then as needed  - Refilled and continued Synalar solution .01%: Apply nightly until clear, then as needed        Follow up:    In 2 months (1st clinic in November), or earlier if needed.     Irene Trujillo MD  [employer]  PGY-3

## 2023-09-07 NOTE — LETTER
September 7, 2023    Tresa Griffin  [address] [place] [address]             [facility]  Family Medicine  [facility] [facility] [address]  Phone: [phone]   September 7, 2023     Patient: Tresa Griffin   YOB: 1995   Date of Visit: 9/7/2023       To Whom it May Concern:    Tresa Griffin was seen in my clinic on 9/7/2023. She may return to work on 09/07/2023 .    Please excuse her from any classes or work missed.    If you have any questions or concerns, please don't hesitate to call.    Sincerely,         Peña Quintero MD

## 2024-06-20 ENCOUNTER — OFFICE VISIT (OUTPATIENT)
Dept: FAMILY MEDICINE | Facility: CLINIC | Age: 29
End: 2024-06-20
Payer: MEDICAID

## 2024-06-20 VITALS
WEIGHT: 223 LBS | DIASTOLIC BLOOD PRESSURE: 85 MMHG | HEART RATE: 78 BPM | SYSTOLIC BLOOD PRESSURE: 127 MMHG | BODY MASS INDEX: 38.07 KG/M2 | RESPIRATION RATE: 18 BRPM | OXYGEN SATURATION: 98 % | HEIGHT: 64 IN | TEMPERATURE: 99 F

## 2024-06-20 DIAGNOSIS — L40.8 SEBOPSORIASIS: Primary | ICD-10-CM

## 2024-06-20 DIAGNOSIS — L40.8 SEBORRHEIC PSORIASIS: ICD-10-CM

## 2024-06-20 PROCEDURE — 99214 OFFICE O/P EST MOD 30 MIN: CPT | Mod: PBBFAC | Performed by: DERMATOLOGY

## 2024-06-20 RX ORDER — KETOCONAZOLE 20 MG/G
CREAM TOPICAL DAILY
Qty: 60 G | Refills: 2 | Status: SHIPPED | OUTPATIENT
Start: 2024-06-20

## 2024-06-20 RX ORDER — FLUOCINOLONE ACETONIDE 0.1 MG/ML
SOLUTION TOPICAL NIGHTLY
Qty: 60 ML | Refills: 5 | Status: SHIPPED | OUTPATIENT
Start: 2024-06-20

## 2024-06-20 RX ORDER — MOMETASONE FUROATE 1 MG/G
CREAM TOPICAL NIGHTLY
Qty: 45 G | Refills: 2 | Status: SHIPPED | OUTPATIENT
Start: 2024-06-20

## 2024-06-20 NOTE — PROGRESS NOTES
"[facility]  Dermatology Office Visit Note    Subjective   Tresa Griffin  [id number]  06/20/2024    Chief Complaint: Medication Refill (fluocinolone (SYNALAR) 0.01 % external solution /ketoconazole (NIZORAL) 2 % shampoo /miconazole (MICOTIN) 2 % cream/) and seborrheic psosriasis     Tresa Griffin is a 28 y.o. female  presenting to [facility] for seborrheic psoriasis.    Last seen 9/7/23 w/ Miconazole 2%. Mix half and half ratio with fluticasone and apply to non hairy areas of sebopsorias twice a day (morning and night) added to regimen. Using every 3 days.   ketoconazole 2% shampoo  Cutivate 0.05% cream non hairy areas   Synalar 0.01% solution       Review of Systems   Constitutional:  Negative for activity change, appetite change, fatigue and fever.   Musculoskeletal:  Negative for arthralgias.   Skin:  Positive for color change.        Dry skin, itching       Objective    Vitals:    06/20/24 0917   BP: 127/85   BP Location: Right arm   Patient Position: Sitting   BP Method: Medium (Automatic)   Pulse: 78   Resp: 18   Temp: 98.5 °F (36.9 °C)   TempSrc: Oral   SpO2: 98%   Weight: 101.2 kg (223 lb)   Height: 5' 4" (1.626 m)        Physical Exam  Constitutional:       General: She is not in acute distress.     Appearance: She is not ill-appearing.   Pulmonary:      Effort: Pulmonary effort is normal.   Skin:     General: Skin is warm and dry.      Comments: Dermatitis/discoloration along scalp hair line and inside right ear.          Current Medications:   Current Outpatient Medications   Medication Sig Dispense Refill    famotidine (PEPCID) 20 MG tablet Take 1 tablet (20 mg total) by mouth once daily. 30 tablet 2    fluocinolone (SYNALAR) 0.01 % external solution Apply topically every evening. Apply nightly to hairy areas until clear. Then apply as needed 60 mL 5    ibuprofen (ADVIL,MOTRIN) 600 MG tablet Take 1 tablet (600 mg total) by mouth every 6 (six) hours as needed (cramping). 30 tablet 0    " ketoconazole (NIZORAL) 2 % cream Apply topically once daily. Mix 1/2 and 1/2 with mometasone cream to affected arease nightly as needed. Affected areas offscalp, hairline and ears. 60 g 2    ketoconazole (NIZORAL) 2 % shampoo Apply topically once a week. Shampoo every 3rd day. Alternate Ketoconazole shampoo with OTC T-gel shampoo. Lather for 5 minutes. 120 mL 5    meclizine (ANTIVERT) 25 mg tablet Take 1 tablet (25 mg total) by mouth 3 (three) times daily as needed for Dizziness. 10 tablet 0    mometasone 0.1% (ELOCON) 0.1 % cream Apply topically every evening. 45 g 2    ondansetron (ZOFRAN) 4 MG tablet Take 1 tablet (4 mg total) by mouth every 6 (six) hours as needed for Nausea. 30 tablet 0    oxyCODONE-acetaminophen (PERCOCET) 5-325 mg per tablet Take 1 tablet by mouth every 6 (six) hours as needed for Pain. 15 tablet 0    promethazine (PHENERGAN) 12.5 MG Tab Take 1 tablet (12.5 mg total) by mouth 2 (two) times a day. 60 tablet 1    promethazine (PHENERGAN) 12.5 MG Tab Take 1 tablet (12.5 mg total) by mouth 2 (two) times a day. 60 tablet 1    sertraline (ZOLOFT) 25 MG tablet Take 25 mg by mouth.      sumatriptan (IMITREX) 50 MG tablet Take 50 mg by mouth as needed.       Current Facility-Administered Medications   Medication Dose Route Frequency Provider Last Rate Last Admin    etonogestreL subdermal device 68 mg  68 mg Implant  Jessy Cutler MD   68 mg at 08/02/23 0940       Assessment & Plan:  1. Sebopsoriasis    2. Seborrheic psoriasis        Orders Placed This Encounter    mometasone 0.1% (ELOCON) 0.1 % cream    ketoconazole (NIZORAL) 2 % cream    fluocinolone (SYNALAR) 0.01 % external solution       - Mix half and half ratio of fluticasone and mometasone. Apply to non hairy areas of sebopsorias twice a day (morning and night)   - ketoconazole 2% shampoo. Lather and leave for 5 mins.  - Synalar 0.01% solution (hair areas) nightly as needed      RTC in 6 months/Jan 2025 clinic  Instructed to see PCP in 2  months for progress    Aileen Bush M.D.  [facility] -3

## 2024-10-10 DIAGNOSIS — L40.8 SEBORRHEIC PSORIASIS: ICD-10-CM

## 2024-10-14 RX ORDER — FLUOCINOLONE ACETONIDE 0.1 MG/ML
SOLUTION TOPICAL NIGHTLY
Qty: 60 ML | Refills: 5 | Status: SHIPPED | OUTPATIENT
Start: 2024-10-14

## 2025-01-16 ENCOUNTER — OFFICE VISIT (OUTPATIENT)
Dept: FAMILY MEDICINE | Facility: CLINIC | Age: 30
End: 2025-01-16
Payer: MEDICAID

## 2025-01-16 VITALS
WEIGHT: 250.63 LBS | HEIGHT: 64 IN | BODY MASS INDEX: 42.79 KG/M2 | RESPIRATION RATE: 20 BRPM | OXYGEN SATURATION: 98 % | DIASTOLIC BLOOD PRESSURE: 80 MMHG | TEMPERATURE: 99 F | SYSTOLIC BLOOD PRESSURE: 118 MMHG | HEART RATE: 78 BPM

## 2025-01-16 DIAGNOSIS — L40.8 SEBORRHEIC PSORIASIS: ICD-10-CM

## 2025-01-16 DIAGNOSIS — L40.8 SEBOPSORIASIS: ICD-10-CM

## 2025-01-16 DIAGNOSIS — L40.50 PSORIATIC ARTHRITIS: Primary | ICD-10-CM

## 2025-01-16 PROCEDURE — 99213 OFFICE O/P EST LOW 20 MIN: CPT | Mod: PBBFAC | Performed by: DERMATOLOGY

## 2025-01-16 RX ORDER — FLUOCINOLONE ACETONIDE 0.1 MG/ML
SOLUTION TOPICAL NIGHTLY
Qty: 60 ML | Refills: 5 | Status: SHIPPED | OUTPATIENT
Start: 2025-01-16

## 2025-01-16 RX ORDER — APREMILAST 30 MG/1
30 TABLET, FILM COATED ORAL 2 TIMES DAILY
Qty: 60 TABLET | Refills: 2 | Status: SHIPPED | OUTPATIENT
Start: 2025-01-22

## 2025-01-16 RX ORDER — KETOCONAZOLE 20 MG/ML
SHAMPOO, SUSPENSION TOPICAL WEEKLY
Qty: 120 ML | Refills: 5 | Status: SHIPPED | OUTPATIENT
Start: 2025-01-16

## 2025-01-16 RX ORDER — MOMETASONE FUROATE 1 MG/G
CREAM TOPICAL
Qty: 45 G | Refills: 2 | Status: SHIPPED | OUTPATIENT
Start: 2025-01-16

## 2025-01-16 RX ORDER — KETOCONAZOLE 20 MG/G
CREAM TOPICAL DAILY
Qty: 60 G | Refills: 2 | Status: SHIPPED | OUTPATIENT
Start: 2025-01-16

## 2025-01-16 NOTE — PATIENT INSTRUCTIONS
START Otezla. Discussed side effects: GI symptoms, depression.     Change ratio of ketoconazole 2% to mometasone 0.1% cream to 2:1 on non hairy areas to decrease adverse effects of steroids. Use qAM.   Ketoconazole 2% shampoo. Lather and leave for 5 mins. Use twice a week.   Synalar 0.01% solution (hair areas) nightly as needed

## 2025-01-16 NOTE — PROGRESS NOTES
"[facility]  Dermatology Office Visit Note    Subjective   Tresa Griffin  [id number]  01/16/2025    Chief Complaint: Sebopsoriasis    Tresa Griffin is a 29 y.o. female  presenting to [facility] for sebopsoriasis. 6 month follow up.     Current treatment:    Half and half ratio of ketoconazole 2% and mometasone 0.1% cream on non hairy areas of sebopsorias once a day in the morning, using almost every day because otherwise would have a flare.   Active areas are periorbital, posterior hairline, breast  Ketoconazole 2% shampoo. Lather and leave for 5 mins. Using every 3-4 days. (Needs to use twice a week)  Synalar 0.01% solution (hair areas) nightly as needed. Using morning and night daily.   - reports nail changes within the last year or so  - admits to morning stiffness of hands that resolves in about 20 minutes  - denies lymph node swelling, fever, chills, hyperthermia  - non-smoker, occasional EtOH, HIV negative (2022).  - relevant meds: none      ROS: see HPI    Objective    Vitals:    01/16/25 0731   BP: 118/80   Pulse: 78   Resp: 20   Temp: 98.6 °F (37 °C)   TempSrc: Oral   SpO2: 98%   Weight: 113.7 kg (250 lb 9.6 oz)   Height: 5' 4" (1.626 m)          Physical Exam  Constitutional:       General: She is not in acute distress.     Appearance: She is not ill-appearing.   Pulmonary:      Effort: Pulmonary effort is normal.   Skin:     General: Skin is warm and dry.      Comments: Fine white scaling posterior hairline. Post-inflammatory hypopigmentation anterior hairline and periorbital area and around areola bilaterally. Nails with distal onycholysis, oil drop discoloration of the nail plate. DIP, PIP, MCP joints without tenderness or swelling bilaterally         Current Medications:   Current Outpatient Medications   Medication Sig Dispense Refill    famotidine (PEPCID) 20 MG tablet Take 1 tablet (20 mg total) by mouth once daily. 30 tablet 2    fluocinolone (SYNALAR) 0.01 % external solution Apply " topically every evening. Apply nightly to hairy areas until clear. Then apply as needed 60 mL 5    ibuprofen (ADVIL,MOTRIN) 600 MG tablet Take 1 tablet (600 mg total) by mouth every 6 (six) hours as needed (cramping). 30 tablet 0    ketoconazole (NIZORAL) 2 % cream Apply topically once daily. Mix 1/2 and 1/2 with mometasone cream to affected arease nightly as needed. Affected areas offscalp, hairline and ears. 60 g 2    ketoconazole (NIZORAL) 2 % shampoo Apply topically once a week. Shampoo every 3rd day. Alternate Ketoconazole shampoo with OTC T-gel shampoo. Lather for 5 minutes. 120 mL 5    meclizine (ANTIVERT) 25 mg tablet Take 1 tablet (25 mg total) by mouth 3 (three) times daily as needed for Dizziness. 10 tablet 0    mometasone 0.1% (ELOCON) 0.1 % cream Apply topically every evening. 45 g 2    ondansetron (ZOFRAN) 4 MG tablet Take 1 tablet (4 mg total) by mouth every 6 (six) hours as needed for Nausea. 30 tablet 0    oxyCODONE-acetaminophen (PERCOCET) 5-325 mg per tablet Take 1 tablet by mouth every 6 (six) hours as needed for Pain. 15 tablet 0    promethazine (PHENERGAN) 12.5 MG Tab Take 1 tablet (12.5 mg total) by mouth 2 (two) times a day. 60 tablet 1    promethazine (PHENERGAN) 12.5 MG Tab Take 1 tablet (12.5 mg total) by mouth 2 (two) times a day. 60 tablet 1    sertraline (ZOLOFT) 25 MG tablet Take 25 mg by mouth.      sumatriptan (IMITREX) 50 MG tablet Take 50 mg by mouth as needed.       Current Facility-Administered Medications   Medication Dose Route Frequency Provider Last Rate Last Admin    etonogestreL subdermal device 68 mg  68 mg Implant  Jessy Cutler MD   68 mg at 08/02/23 0940       Assessment & Plan:  1. Psoriatic arthritis    2. Sebopsoriasis         Psoriatic nail disease and psoriatic arthritis noted.  START Otezla. Discussed side effects: GI symptoms, depression.     Change ratio of ketoconazole 2% to mometasone 0.1% cream to 2:1 on non hairy areas to decrease adverse effects of  steroids. Use qAM.   Ketoconazole 2% shampoo. Lather and leave for 5 mins. Use twice a week.   Synalar 0.01% solution (hair areas) nightly as needed    Lifestyle measures recommended (maintain ideal body weight, avoid tobacco, limit EtOH, reduce stress)      Return to Dermatology clinic in 2 months, after starting Otezla.

## 2025-02-04 ENCOUNTER — HOSPITAL ENCOUNTER (EMERGENCY)
Facility: HOSPITAL | Age: 30
Discharge: HOME | End: 2025-02-04
Attending: STUDENT IN AN ORGANIZED HEALTH CARE EDUCATION/TRAINING PROGRAM
Payer: MEDICAID

## 2025-02-04 VITALS
RESPIRATION RATE: 18 BRPM | OXYGEN SATURATION: 98 % | BODY MASS INDEX: 40.97 KG/M2 | DIASTOLIC BLOOD PRESSURE: 75 MMHG | WEIGHT: 240 LBS | HEART RATE: 88 BPM | SYSTOLIC BLOOD PRESSURE: 120 MMHG | TEMPERATURE: 98 F | HEIGHT: 64 IN

## 2025-02-04 DIAGNOSIS — I10 HTN (HYPERTENSION): ICD-10-CM

## 2025-02-04 DIAGNOSIS — R03.0 ELEVATED BLOOD PRESSURE READING WITHOUT DIAGNOSIS OF HYPERTENSION: Primary | ICD-10-CM

## 2025-02-04 LAB
ALBUMIN SERPL-MCNC: 3.9 G/DL (ref 3.5–5)
ALBUMIN/GLOB SERPL: 0.9 RATIO (ref 1.1–2)
ALP SERPL-CCNC: 71 UNIT/L (ref 40–150)
ALT SERPL-CCNC: 19 UNIT/L (ref 0–55)
ANION GAP SERPL CALC-SCNC: 6 MEQ/L
AST SERPL-CCNC: 17 UNIT/L (ref 5–34)
B-HCG UR QL: NEGATIVE
BACTERIA #/AREA URNS AUTO: ABNORMAL /HPF
BASOPHILS # BLD AUTO: 0.04 X10(3)/MCL
BASOPHILS NFR BLD AUTO: 0.4 %
BILIRUB SERPL-MCNC: 0.2 MG/DL
BILIRUB UR QL STRIP.AUTO: NEGATIVE
BUN SERPL-MCNC: 13.2 MG/DL (ref 7–18.7)
CALCIUM SERPL-MCNC: 9.4 MG/DL (ref 8.4–10.2)
CHLORIDE SERPL-SCNC: 109 MMOL/L (ref 98–107)
CLARITY UR: CLEAR
CO2 SERPL-SCNC: 25 MMOL/L (ref 22–29)
COLOR UR AUTO: ABNORMAL
CREAT SERPL-MCNC: 0.82 MG/DL (ref 0.55–1.02)
CREAT/UREA NIT SERPL: 16
EOSINOPHIL # BLD AUTO: 0.08 X10(3)/MCL (ref 0–0.9)
EOSINOPHIL NFR BLD AUTO: 0.9 %
ERYTHROCYTE [DISTWIDTH] IN BLOOD BY AUTOMATED COUNT: 12.8 % (ref 11.5–17)
GFR SERPLBLD CREATININE-BSD FMLA CKD-EPI: >60 ML/MIN/1.73/M2
GLOBULIN SER-MCNC: 4.2 GM/DL (ref 2.4–3.5)
GLUCOSE SERPL-MCNC: 97 MG/DL (ref 74–100)
GLUCOSE UR QL STRIP: NEGATIVE
HCT VFR BLD AUTO: 38.9 % (ref 37–47)
HGB BLD-MCNC: 13 G/DL (ref 12–16)
HGB UR QL STRIP: NEGATIVE
IMM GRANULOCYTES # BLD AUTO: 0.02 X10(3)/MCL (ref 0–0.04)
IMM GRANULOCYTES NFR BLD AUTO: 0.2 %
KETONES UR QL STRIP: NEGATIVE
LEUKOCYTE ESTERASE UR QL STRIP: ABNORMAL
LYMPHOCYTES # BLD AUTO: 2.52 X10(3)/MCL (ref 0.6–4.6)
LYMPHOCYTES NFR BLD AUTO: 27 %
MCH RBC QN AUTO: 29.4 PG (ref 27–31)
MCHC RBC AUTO-ENTMCNC: 33.4 G/DL (ref 33–36)
MCV RBC AUTO: 88 FL (ref 80–94)
MONOCYTES # BLD AUTO: 0.6 X10(3)/MCL (ref 0.1–1.3)
MONOCYTES NFR BLD AUTO: 6.4 %
NEUTROPHILS # BLD AUTO: 6.06 X10(3)/MCL (ref 2.1–9.2)
NEUTROPHILS NFR BLD AUTO: 65.1 %
NITRITE UR QL STRIP: NEGATIVE
PH UR STRIP: 6.5 [PH]
PLATELET # BLD AUTO: 267 X10(3)/MCL (ref 130–400)
PMV BLD AUTO: 10.3 FL (ref 7.4–10.4)
POTASSIUM SERPL-SCNC: 4.6 MMOL/L (ref 3.5–5.1)
PROT SERPL-MCNC: 8.1 GM/DL (ref 6.4–8.3)
PROT UR QL STRIP: NEGATIVE
RBC # BLD AUTO: 4.42 X10(6)/MCL (ref 4.2–5.4)
RBC #/AREA URNS AUTO: ABNORMAL /HPF
SODIUM SERPL-SCNC: 140 MMOL/L (ref 136–145)
SP GR UR STRIP.AUTO: 1.02 (ref 1–1.03)
SQUAMOUS #/AREA URNS AUTO: ABNORMAL /HPF
TROPONIN I SERPL-MCNC: <0.01 NG/ML (ref 0–0.04)
UROBILINOGEN UR STRIP-ACNC: 0.2
WBC # BLD AUTO: 9.32 X10(3)/MCL (ref 4.5–11.5)
WBC #/AREA URNS AUTO: ABNORMAL /HPF

## 2025-02-04 PROCEDURE — 93005 ELECTROCARDIOGRAM TRACING: CPT

## 2025-02-04 PROCEDURE — 81003 URINALYSIS AUTO W/O SCOPE: CPT | Performed by: NURSE PRACTITIONER

## 2025-02-04 PROCEDURE — 80053 COMPREHEN METABOLIC PANEL: CPT | Performed by: NURSE PRACTITIONER

## 2025-02-04 PROCEDURE — 81025 URINE PREGNANCY TEST: CPT | Performed by: NURSE PRACTITIONER

## 2025-02-04 PROCEDURE — 84484 ASSAY OF TROPONIN QUANT: CPT | Performed by: NURSE PRACTITIONER

## 2025-02-04 PROCEDURE — 85025 COMPLETE CBC W/AUTO DIFF WBC: CPT | Performed by: NURSE PRACTITIONER

## 2025-02-04 PROCEDURE — 99285 EMERGENCY DEPT VISIT HI MDM: CPT | Mod: 25

## 2025-02-04 NOTE — ED PROVIDER NOTES
Encounter Date: 2/4/2025       History     Chief Complaint   Patient presents with    Dizziness     Pt was at work experienced dizziness, measured her bp and each time it went up     29 year old female presents to ER complaining of dizziness, chest tightness and elevated blood pressure. 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 She states she checked her blood pressure and it was elevated. She reports checking is several times and it stayed elevated. She does report having to be hospitalized 1 week after delivering her baby for HTN but was not put on medications after leaving the hospital. No HA, blurred vision or unilateral weakness.     The history is provided by the patient. No  was used.     Review of patient's allergies indicates:  No Known Allergies  No past medical history on file.  No past surgical history on file.  Family History   Problem Relation Name Age of Onset    Hypertension Mother       Social History     Tobacco Use    Smoking status: Never    Smokeless tobacco: Never   Substance Use Topics    Alcohol use: Never    Drug use: Never     Review of Systems   Constitutional:  Negative for chills and fever.   Eyes:  Negative for visual disturbance.   Respiratory:  Positive for shortness of breath.    Cardiovascular:  Positive for chest pain. Negative for palpitations and leg swelling.   Gastrointestinal:  Negative for vomiting.   Neurological:  Negative for dizziness, syncope and light-headedness.   All other systems reviewed and are negative.      Physical Exam     Initial Vitals [02/04/25 1422]   BP Pulse Resp Temp SpO2   (!) 138/98 88 18 98.3 °F (36.8 °C) 98 %      MAP       --         Physical Exam    Nursing note and vitals reviewed.  Constitutional: She appears well-developed and well-nourished.   HENT:   Head: Normocephalic and atraumatic.   Eyes: EOM are normal. Pupils are equal, round, and reactive to light.   Neck: Neck supple.    Cardiovascular:  Normal rate and regular rhythm.           Pulmonary/Chest: Breath sounds normal. No respiratory distress.   Abdominal: Abdomen is soft. She exhibits no distension.   Musculoskeletal:         General: Normal range of motion.      Cervical back: Neck supple.     Neurological: She is alert and oriented to person, place, and time. She has normal strength. No cranial nerve deficit. GCS score is 15. GCS eye subscore is 4. GCS verbal subscore is 5. GCS motor subscore is 6.   Skin: Skin is warm and dry. Capillary refill takes less than 2 seconds.   Psychiatric: She has a normal mood and affect.         ED Course   Procedures  Labs Reviewed   URINALYSIS, REFLEX TO URINE CULTURE - Abnormal       Result Value    Color, UA Straw      Appearance, UA Clear      Specific Gravity, UA 1.020      pH, UA 6.5      Protein, UA Negative      Glucose, UA Negative      Ketones, UA Negative      Blood, UA Negative      Bilirubin, UA Negative      Urobilinogen, UA 0.2      Nitrites, UA Negative      Leukocyte Esterase, UA Trace (*)    COMPREHENSIVE METABOLIC PANEL - Abnormal    Sodium 140      Potassium 4.6      Chloride 109 (*)     CO2 25      Glucose 97      Blood Urea Nitrogen 13.2      Creatinine 0.82      Calcium 9.4      Protein Total 8.1      Albumin 3.9      Globulin 4.2 (*)     Albumin/Globulin Ratio 0.9 (*)     Bilirubin Total 0.2      ALP 71      ALT 19      AST 17      eGFR >60      Anion Gap 6.0      BUN/Creatinine Ratio 16     URINALYSIS, MICROSCOPIC - Abnormal    Bacteria, UA None Seen      RBC, UA None Seen      WBC, UA 0-2      Squamous Epithelial Cells, UA Few (*)    PREGNANCY TEST, URINE RAPID - Normal    hCG Qualitative, Urine Negative     TROPONIN I - Normal    Troponin-I <0.010     CBC W/ AUTO DIFFERENTIAL    Narrative:     The following orders were created for panel order CBC auto differential.  Procedure                               Abnormality         Status                     ---------                                -----------         ------                     CBC with Differential[[id number]]                           Final result                 Please view results for these tests on the individual orders.   CBC WITH DIFFERENTIAL    WBC 9.32      RBC 4.42      Hgb 13.0      Hct 38.9      MCV 88.0      MCH 29.4      MCHC 33.4      RDW 12.8      Platelet 267      MPV 10.3      Neut % 65.1      Lymph % 27.0      Mono % 6.4      Eos % 0.9      Basophil % 0.4      Imm Grans % 0.2      Neut # 6.06      Lymph # 2.52      Mono # 0.60      Eos # 0.08      Baso # 0.04      Imm Gran # 0.02       EKG Readings: (Independently Interpreted)   Initial Reading: No STEMI. Rhythm: Normal Sinus Rhythm. Heart Rate: 78. Clinical Impression: Normal Sinus Rhythm       Imaging Results    None          Medications - No data to display  Medical Decision Making  DDX: HTN, electrolyte imbalance, Mi, Dehydration, UTI    29 year old female presents to ER complaining of dizziness, chest tightness and elevated blood pressure. 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 She states she checked her blood pressure and it was elevated. She reports checking is several times and it stayed elevated. She does report having to be hospitalized 1 week after delivering her baby for HTN but was not put on medications after leaving the hospital. No HA, blurred vision or unilateral weakness.  Sodium potassium and creatinine within normal limits.  Troponin less than 0.010.  UPT is negative.  No leukocytosis with a normal H&H.  Trace leukocytes but no nitrites or bacteria in urine.  Patient blood pressure was 138/98 upon arrival. She is 120/75 at 16:25. Will discharge home and have her f/u with PCP.     Amount and/or Complexity of Data Reviewed  Labs: ordered. Decision-making details documented in ED Course.    Risk  OTC drugs.               ED Course as of 02/04/25 1629 Tue Feb 04, 2025   1608 Sodium: 140  [LN]   1608 Potassium: 4.6 [LN]   1608 Creatinine: 0.82 [LN]   1608 Troponin I: <0.010 [LN]   1608 hCG Qualitative, Urine: Negative [LN]   1619 WBC: 9.32 [LN]   1619 Hemoglobin: 13.0 [LN]   1619 Hematocrit: 38.9 [LN]   1621 NITRITE UA: Negative [LN]   1621 Leukocyte Esterase, UA(!): Trace [LN]   1621 Bacteria, UA: None Seen [LN]      ED Course User Index  [LN] Beatrice Castro FNP                           Clinical Impression:  Final diagnoses:  [I10] HTN (hypertension)  [R03.0] Elevated blood pressure reading without diagnosis of hypertension (Primary)          ED Disposition Condition    Discharge Stable          ED Prescriptions    None       Follow-up Information    None          Beatrice Castro FNP  02/04/25 8737

## 2025-02-04 NOTE — Clinical Note
"Tresa "Tresa" Skipper was seen and treated in our emergency department on 2/4/2025.  She may return to work on 02/06/2025.       If you have any questions or concerns, please don't hesitate to call.      Beatrice Castro, GURPREET"

## 2025-02-05 LAB
OHS QRS DURATION: 72 MS
OHS QTC CALCULATION: 414 MS

## 2025-04-17 ENCOUNTER — OFFICE VISIT (OUTPATIENT)
Dept: FAMILY MEDICINE | Facility: CLINIC | Age: 30
End: 2025-04-17

## 2025-04-17 VITALS
WEIGHT: 252 LBS | BODY MASS INDEX: 43.02 KG/M2 | OXYGEN SATURATION: 99 % | HEIGHT: 64 IN | SYSTOLIC BLOOD PRESSURE: 102 MMHG | HEART RATE: 68 BPM | DIASTOLIC BLOOD PRESSURE: 64 MMHG

## 2025-04-17 DIAGNOSIS — L40.8 SEBORRHEIC PSORIASIS: Primary | ICD-10-CM

## 2025-04-17 DIAGNOSIS — L40.50 PSORIATIC ARTHRITIS: ICD-10-CM

## 2025-04-17 RX ORDER — HYDROCHLOROTHIAZIDE 25 MG/1
25 TABLET ORAL EVERY MORNING
COMMUNITY

## 2025-04-17 RX ORDER — FLUOCINOLONE ACETONIDE 0.1 MG/ML
SOLUTION TOPICAL NIGHTLY
Qty: 60 ML | Refills: 5 | Status: SHIPPED | OUTPATIENT
Start: 2025-04-17

## 2025-04-17 RX ORDER — KETOCONAZOLE 20 MG/ML
SHAMPOO, SUSPENSION TOPICAL
Qty: 120 ML | Refills: 5 | Status: SHIPPED | OUTPATIENT
Start: 2025-04-17

## 2025-04-17 RX ORDER — MOMETASONE FUROATE 1 MG/G
CREAM TOPICAL
Qty: 45 G | Refills: 2 | Status: SHIPPED | OUTPATIENT
Start: 2025-04-17

## 2025-04-17 RX ORDER — KETOCONAZOLE 20 MG/G
CREAM TOPICAL DAILY
Qty: 60 G | Refills: 2 | Status: SHIPPED | OUTPATIENT
Start: 2025-04-17

## 2025-04-17 NOTE — PROGRESS NOTES
"[facility]  Dermatology Office Visit Note    Subjective   Tresa Griffin  [id number]  04/17/2025    Chief Complaint: Follow-up (2m FU for Psoriatic arthritis. Medications somewhat working still feeling the pain.)    Tresa Griffin is a 29 y.o. female  presenting to [facility] for sebopsoriasis. 2 month follow up.       Patient with h/o sebopsoriasis. Active areas are periorbital, hairline, scalp, breasts. Patient also affected by psoriatic arthritis (hand pain) and nail disease. Started Otezla at last visit. Occasionally getting nausea with Otezla when taking on empty stomach. Also experiencing daily diarrhea (twice/day) since starting Otezla. Denies depression. Feels some improvement in nail appearance. No change in arthritis.    Current treatment:    2:1 ratio of ketoconazole 2% and mometasone 0.1% cream on non hairy areas of sebopsorias qAM.  Ketoconazole 2% shampoo. Lather and leave for 5 mins. Using twice a week.  Synalar 0.01% solution (hairy areas) nightly as needed.   Otezla 30mg BID. Started at last visit.      - non-smoker, occasional EtOH, HIV negative (2022).  - PMH: HTN. On HCTZ.  - expresses desire for future fertility (within this year)    ROS: see HPI    Objective    Vitals:    04/17/25 0717   BP: 102/64   BP Location: Left arm   Patient Position: Sitting   Pulse: 68   SpO2: 99%   Weight: 114.3 kg (252 lb)   Height: 5' 4.02" (1.626 m)            Physical Exam  Constitutional:       General: She is not in acute distress.     Appearance: She is not ill-appearing.   Cardiovascular:      Rate and Rhythm: Normal rate.   Pulmonary:      Effort: Pulmonary effort is normal.   Skin:     General: Skin is warm and dry.      Comments: Fine white scaling to hairline. Post-inflammatory hypopigmentation anterior hairline and periorbital area. Nails with distal onycholysis, oil drop discoloration of the nail plate. DIP, PIP, MCP joints without tenderness or swelling bilaterally   Neurological:      Mental " Status: She is oriented to person, place, and time.         Current Medications:   Current Outpatient Medications   Medication Sig Dispense Refill    famotidine (PEPCID) 20 MG tablet Take 1 tablet (20 mg total) by mouth once daily. 30 tablet 2    fluocinolone (SYNALAR) 0.01 % external solution Apply topically every evening. Apply nightly as needed to hairy areas and affected nails until clear. 60 mL 5    hydroCHLOROthiazide (HYDRODIURIL) 25 MG tablet Take 25 mg by mouth every morning.      ibuprofen (ADVIL,MOTRIN) 600 MG tablet Take 1 tablet (600 mg total) by mouth every 6 (six) hours as needed (cramping). 30 tablet 0    ketoconazole (NIZORAL) 2 % cream Apply topically once daily. Mix ketoconazole 2% to mometasone 0.1% cream as 2:1 ratio. Apply every morning on non hairy areas affected by sebopsoriasis. 60 g 2    ketoconazole (NIZORAL) 2 % shampoo Apply topically twice a week. 120 mL 5    meclizine (ANTIVERT) 25 mg tablet Take 1 tablet (25 mg total) by mouth 3 (three) times daily as needed for Dizziness. 10 tablet 0    mometasone 0.1% (ELOCON) 0.1 % cream Mix ketoconazole 2% to mometasone 0.1% cream as 2:1 ratio. Apply every morning on non hairy areas affected by sebopsoriasis. 45 g 2    ondansetron (ZOFRAN) 4 MG tablet Take 1 tablet (4 mg total) by mouth every 6 (six) hours as needed for Nausea. 30 tablet 0    oxyCODONE-acetaminophen (PERCOCET) 5-325 mg per tablet Take 1 tablet by mouth every 6 (six) hours as needed for Pain. 15 tablet 0    promethazine (PHENERGAN) 12.5 MG Tab Take 1 tablet (12.5 mg total) by mouth 2 (two) times a day. 60 tablet 1    promethazine (PHENERGAN) 12.5 MG Tab Take 1 tablet (12.5 mg total) by mouth 2 (two) times a day. 60 tablet 1    sertraline (ZOLOFT) 25 MG tablet Take 25 mg by mouth.      sumatriptan (IMITREX) 50 MG tablet Take 50 mg by mouth as needed.       Current Facility-Administered Medications   Medication Dose Route Frequency Provider Last Rate Last Admin    etonogestreL  subdermal device 68 mg  68 mg Implant  Jessy Cutler MD   68 mg at 08/02/23 0978       Assessment & Plan:  1. Seborrheic psoriasis    2. Psoriatic arthritis        Orders Placed This Encounter    fluocinolone (SYNALAR) 0.01 % external solution    ketoconazole (NIZORAL) 2 % cream    ketoconazole (NIZORAL) 2 % shampoo    mometasone 0.1% (ELOCON) 0.1 % cream     -Continue 2:1 ratio of ketoconazole 2% and mometasone 0.1% cream on non hairy areas of sebopsorias qAM.  -Use Ketoconazole 2% shampoo. Lather and leave for 3-5 mins. Use twice a week.  -Use Synalar 0.01% solution on hairy areas and nails nightly as needed.   -GI intolerance of Otezla. Stop Otezla. Desires fertility. Will hold off on systemic management of psiorasis and psoriatic arthritis. Discussed use of Tylenol Arthritis 625mg q8h PRN for arthritis pain.  -Lifestyle measures recommended (maintain ideal body weight, avoid tobacco, limit EtOH, reduce stress)      Return to Dermatology clinic in 6 months.      Holli Lord MD  [facility], [facility]